# Patient Record
Sex: MALE | Race: WHITE | NOT HISPANIC OR LATINO | Employment: FULL TIME | URBAN - METROPOLITAN AREA
[De-identification: names, ages, dates, MRNs, and addresses within clinical notes are randomized per-mention and may not be internally consistent; named-entity substitution may affect disease eponyms.]

---

## 2018-02-27 ENCOUNTER — OFFICE VISIT (OUTPATIENT)
Dept: INTERNAL MEDICINE | Facility: CLINIC | Age: 33
End: 2018-02-27
Payer: COMMERCIAL

## 2018-02-27 ENCOUNTER — LAB VISIT (OUTPATIENT)
Dept: LAB | Facility: HOSPITAL | Age: 33
End: 2018-02-27
Attending: FAMILY MEDICINE
Payer: COMMERCIAL

## 2018-02-27 DIAGNOSIS — M72.2 PLANTAR FASCIITIS: ICD-10-CM

## 2018-02-27 DIAGNOSIS — Z00.00 ROUTINE MEDICAL EXAM: ICD-10-CM

## 2018-02-27 DIAGNOSIS — E66.3 OVERWEIGHT: ICD-10-CM

## 2018-02-27 DIAGNOSIS — Z00.00 ROUTINE MEDICAL EXAM: Primary | ICD-10-CM

## 2018-02-27 LAB
ALBUMIN SERPL BCP-MCNC: 4.2 G/DL
ALP SERPL-CCNC: 62 U/L
ALT SERPL W/O P-5'-P-CCNC: 13 U/L
ANION GAP SERPL CALC-SCNC: 8 MMOL/L
AST SERPL-CCNC: 14 U/L
BASOPHILS # BLD AUTO: 0.03 K/UL
BASOPHILS NFR BLD: 0.7 %
BILIRUB SERPL-MCNC: 0.9 MG/DL
BUN SERPL-MCNC: 11 MG/DL
CALCIUM SERPL-MCNC: 9.7 MG/DL
CHLORIDE SERPL-SCNC: 104 MMOL/L
CHOLEST SERPL-MCNC: 163 MG/DL
CHOLEST/HDLC SERPL: 3.7 {RATIO}
CO2 SERPL-SCNC: 28 MMOL/L
CREAT SERPL-MCNC: 0.9 MG/DL
DIFFERENTIAL METHOD: NORMAL
EOSINOPHIL # BLD AUTO: 0.1 K/UL
EOSINOPHIL NFR BLD: 1.1 %
ERYTHROCYTE [DISTWIDTH] IN BLOOD BY AUTOMATED COUNT: 12.2 %
EST. GFR  (AFRICAN AMERICAN): >60 ML/MIN/1.73 M^2
EST. GFR  (NON AFRICAN AMERICAN): >60 ML/MIN/1.73 M^2
ESTIMATED AVG GLUCOSE: 97 MG/DL
GLUCOSE SERPL-MCNC: 83 MG/DL
HBA1C MFR BLD HPLC: 5 %
HCT VFR BLD AUTO: 42.6 %
HDLC SERPL-MCNC: 44 MG/DL
HDLC SERPL: 27 %
HGB BLD-MCNC: 14.1 G/DL
IMM GRANULOCYTES # BLD AUTO: 0.02 K/UL
IMM GRANULOCYTES NFR BLD AUTO: 0.4 %
LDLC SERPL CALC-MCNC: 109.4 MG/DL
LYMPHOCYTES # BLD AUTO: 1.8 K/UL
LYMPHOCYTES NFR BLD: 38.7 %
MCH RBC QN AUTO: 30 PG
MCHC RBC AUTO-ENTMCNC: 33.1 G/DL
MCV RBC AUTO: 91 FL
MONOCYTES # BLD AUTO: 0.5 K/UL
MONOCYTES NFR BLD: 10 %
NEUTROPHILS # BLD AUTO: 2.2 K/UL
NEUTROPHILS NFR BLD: 49.1 %
NONHDLC SERPL-MCNC: 119 MG/DL
NRBC BLD-RTO: 0 /100 WBC
PLATELET # BLD AUTO: 219 K/UL
PMV BLD AUTO: 11.4 FL
POTASSIUM SERPL-SCNC: 4.3 MMOL/L
PROT SERPL-MCNC: 7.5 G/DL
RBC # BLD AUTO: 4.7 M/UL
SODIUM SERPL-SCNC: 140 MMOL/L
T4 FREE SERPL-MCNC: 1.16 NG/DL
TRIGL SERPL-MCNC: 48 MG/DL
TSH SERPL DL<=0.005 MIU/L-ACNC: 0.8 UIU/ML
WBC # BLD AUTO: 4.52 K/UL

## 2018-02-27 PROCEDURE — 84439 ASSAY OF FREE THYROXINE: CPT

## 2018-02-27 PROCEDURE — 99395 PREV VISIT EST AGE 18-39: CPT | Mod: S$GLB,,, | Performed by: FAMILY MEDICINE

## 2018-02-27 PROCEDURE — 84443 ASSAY THYROID STIM HORMONE: CPT

## 2018-02-27 PROCEDURE — 80053 COMPREHEN METABOLIC PANEL: CPT

## 2018-02-27 PROCEDURE — 85025 COMPLETE CBC W/AUTO DIFF WBC: CPT

## 2018-02-27 PROCEDURE — 99999 PR PBB SHADOW E&M-EST. PATIENT-LVL III: CPT | Mod: PBBFAC,,, | Performed by: FAMILY MEDICINE

## 2018-02-27 PROCEDURE — 83036 HEMOGLOBIN GLYCOSYLATED A1C: CPT

## 2018-02-27 PROCEDURE — 36415 COLL VENOUS BLD VENIPUNCTURE: CPT | Mod: PO

## 2018-02-27 PROCEDURE — 80061 LIPID PANEL: CPT

## 2018-02-28 VITALS
WEIGHT: 216.25 LBS | HEART RATE: 54 BPM | BODY MASS INDEX: 29.29 KG/M2 | DIASTOLIC BLOOD PRESSURE: 74 MMHG | HEIGHT: 72 IN | TEMPERATURE: 98 F | RESPIRATION RATE: 14 BRPM | SYSTOLIC BLOOD PRESSURE: 124 MMHG

## 2018-02-28 PROBLEM — E66.3 OVERWEIGHT: Status: ACTIVE | Noted: 2018-02-28

## 2018-02-28 PROBLEM — M72.2 PLANTAR FASCIITIS: Status: ACTIVE | Noted: 2018-02-28

## 2018-02-28 NOTE — PROGRESS NOTES
Subjective:   Patient ID: Spenser Alamo is a 32 y.o. male.    Chief Complaint: Establish Care      HPI  31 yo male here to est w pcp.     Patient queried and denies any further complaints.    PREVENTIVE MED  Diet  Exercise  Colorectal Ca  Alcohol use  Tobacco  BP  Depression  Type 2 DM  Hep C  STD  Vision  ALL REVIEWED    ALLERGIES AND MEDICATIONS: updated and reviewed.  Review of patient's allergies indicates:   Allergen Reactions    Amoxicillin Rash     No current outpatient prescriptions on file.    Review of Systems   Constitutional: Negative for activity change and unexpected weight change.   HENT: Negative for hearing loss, rhinorrhea and trouble swallowing.    Eyes: Negative for discharge and visual disturbance.   Respiratory: Negative for chest tightness and wheezing.    Cardiovascular: Negative for chest pain and palpitations.   Gastrointestinal: Negative for blood in stool, constipation, diarrhea and vomiting.   Endocrine: Negative for polydipsia and polyuria.   Genitourinary: Negative for difficulty urinating, hematuria and urgency.   Musculoskeletal: Negative for arthralgias, joint swelling and neck pain.   Skin: Negative for rash and wound.   Allergic/Immunologic: Negative for food allergies and immunocompromised state.   Neurological: Negative for weakness and headaches.   Hematological: Does not bruise/bleed easily.   Psychiatric/Behavioral: Negative for confusion and dysphoric mood.       Objective:     Vitals:    02/27/18 1301   BP: 124/74   Pulse: (!) 54   Resp: 14   Temp: 98 °F (36.7 °C)   TempSrc: Oral   Weight: 98.1 kg (216 lb 4.3 oz)   Height: 6' (1.829 m)   PainSc:   4   PainLoc: Foot     Body mass index is 29.33 kg/m².    Physical Exam   Constitutional: He is oriented to person, place, and time. He appears well-developed and well-nourished. He is cooperative. He does not have a sickly appearance. No distress.   HENT:   Head: Normocephalic and atraumatic.   Right Ear: Hearing, tympanic  membrane, external ear and ear canal normal. No tenderness.   Left Ear: Hearing, tympanic membrane, external ear and ear canal normal. No tenderness.   Nose: Nose normal.   Mouth/Throat: Oropharynx is clear and moist.   Eyes: Conjunctivae and lids are normal. Pupils are equal, round, and reactive to light. Right eye exhibits no discharge. Left eye exhibits no discharge. Right conjunctiva is not injected. Left conjunctiva is not injected. No scleral icterus. Right eye exhibits normal extraocular motion. Left eye exhibits normal extraocular motion.   Neck: Normal range of motion. Neck supple. No JVD present. Carotid bruit is not present. No tracheal deviation and no edema present. No thyromegaly present.   Cardiovascular: Normal rate, regular rhythm, normal heart sounds and normal pulses.  Exam reveals no friction rub.    No murmur heard.  Pulmonary/Chest: Effort normal and breath sounds normal. No accessory muscle usage. No respiratory distress. He has no wheezes. He has no rhonchi. He has no rales.   Abdominal: Soft. Bowel sounds are normal. He exhibits no distension, no abdominal bruit, no pulsatile midline mass and no mass. There is no hepatosplenomegaly. There is no tenderness. There is no rebound, no guarding, no CVA tenderness, no tenderness at McBurney's point and negative Henley's sign.   Musculoskeletal: He exhibits no edema.   Lymphadenopathy:        Head (right side): No submandibular, no preauricular and no posterior auricular adenopathy present.        Head (left side): No submandibular, no preauricular and no posterior auricular adenopathy present.     He has no cervical adenopathy.   Neurological: He is alert and oriented to person, place, and time. GCS eye subscore is 4. GCS verbal subscore is 5. GCS motor subscore is 6.   Skin: Skin is warm and dry. No ecchymosis and no rash noted. Rash is not maculopapular and not urticarial. He is not diaphoretic. No cyanosis or erythema. Nails show no clubbing.    Psychiatric: He has a normal mood and affect. His speech is normal and behavior is normal. Thought content normal. His mood appears not anxious. His affect is not angry and not inappropriate. He does not exhibit a depressed mood.       Assessment and Plan:   Spenser was seen today for establish care.    Diagnoses and all orders for this visit:    Routine medical exam  -     CBC auto differential; Future  -     Comprehensive metabolic panel; Future  -     Hemoglobin A1c; Future  -     Lipid panel; Future  -     TSH; Future  -     T4, free; Future    Plantar fasciitis  -     Cancel: Ambulatory consult to Podiatry    Overweight    declines referral for now. Discussed orthotics. Compression socks specifically for plantar fasc and nsaids.        Follow-up in about 1 year (around 2/27/2019).    THIS NOTE WILL BE SHARED WITH THE PATIENT.

## 2019-01-17 ENCOUNTER — OFFICE VISIT (OUTPATIENT)
Dept: INTERNAL MEDICINE | Facility: CLINIC | Age: 34
End: 2019-01-17
Payer: COMMERCIAL

## 2019-01-17 VITALS
OXYGEN SATURATION: 97 % | HEART RATE: 91 BPM | BODY MASS INDEX: 31.32 KG/M2 | SYSTOLIC BLOOD PRESSURE: 104 MMHG | WEIGHT: 231.25 LBS | TEMPERATURE: 100 F | HEIGHT: 72 IN | DIASTOLIC BLOOD PRESSURE: 77 MMHG

## 2019-01-17 DIAGNOSIS — B96.89 ACUTE BACTERIAL BRONCHITIS: Primary | ICD-10-CM

## 2019-01-17 DIAGNOSIS — E66.9 OBESITY, UNSPECIFIED CLASSIFICATION, UNSPECIFIED OBESITY TYPE, UNSPECIFIED WHETHER SERIOUS COMORBIDITY PRESENT: ICD-10-CM

## 2019-01-17 DIAGNOSIS — J20.8 ACUTE BACTERIAL BRONCHITIS: Primary | ICD-10-CM

## 2019-01-17 DIAGNOSIS — M72.2 PLANTAR FASCIITIS: ICD-10-CM

## 2019-01-17 PROCEDURE — 96372 THER/PROPH/DIAG INJ SC/IM: CPT | Mod: S$GLB,,, | Performed by: FAMILY MEDICINE

## 2019-01-17 PROCEDURE — 99214 PR OFFICE/OUTPT VISIT, EST, LEVL IV, 30-39 MIN: ICD-10-PCS | Mod: 25,S$GLB,, | Performed by: FAMILY MEDICINE

## 2019-01-17 PROCEDURE — 99214 OFFICE O/P EST MOD 30 MIN: CPT | Mod: 25,S$GLB,, | Performed by: FAMILY MEDICINE

## 2019-01-17 PROCEDURE — 99999 PR PBB SHADOW E&M-EST. PATIENT-LVL III: CPT | Mod: PBBFAC,,, | Performed by: FAMILY MEDICINE

## 2019-01-17 PROCEDURE — 96372 PR INJECTION,THERAP/PROPH/DIAG2ST, IM OR SUBCUT: ICD-10-PCS | Mod: S$GLB,,, | Performed by: FAMILY MEDICINE

## 2019-01-17 PROCEDURE — 99999 PR PBB SHADOW E&M-EST. PATIENT-LVL III: ICD-10-PCS | Mod: PBBFAC,,, | Performed by: FAMILY MEDICINE

## 2019-01-17 RX ORDER — PROMETHAZINE HYDROCHLORIDE AND CODEINE PHOSPHATE 6.25; 1 MG/5ML; MG/5ML
5 SOLUTION ORAL NIGHTLY PRN
Qty: 118 ML | Refills: 0 | Status: SHIPPED | OUTPATIENT
Start: 2019-01-17 | End: 2019-01-27

## 2019-01-17 RX ORDER — METHYLPREDNISOLONE 4 MG/1
TABLET ORAL
Qty: 1 PACKAGE | Refills: 0 | Status: SHIPPED | OUTPATIENT
Start: 2019-01-17 | End: 2019-01-17 | Stop reason: SDUPTHER

## 2019-01-17 RX ORDER — AZITHROMYCIN 250 MG/1
TABLET, FILM COATED ORAL
Qty: 6 TABLET | Refills: 0 | Status: SHIPPED | OUTPATIENT
Start: 2019-01-17 | End: 2019-01-17 | Stop reason: SDUPTHER

## 2019-01-17 RX ORDER — PROMETHAZINE HYDROCHLORIDE AND CODEINE PHOSPHATE 6.25; 1 MG/5ML; MG/5ML
5 SOLUTION ORAL NIGHTLY PRN
Qty: 118 ML | Refills: 0 | Status: SHIPPED | OUTPATIENT
Start: 2019-01-17 | End: 2019-01-17 | Stop reason: SDUPTHER

## 2019-01-17 RX ORDER — AZITHROMYCIN 250 MG/1
TABLET, FILM COATED ORAL
Qty: 6 TABLET | Refills: 0 | Status: SHIPPED | OUTPATIENT
Start: 2019-01-17 | End: 2020-02-13

## 2019-01-17 RX ORDER — TRIAMCINOLONE ACETONIDE 40 MG/ML
40 INJECTION, SUSPENSION INTRA-ARTICULAR; INTRAMUSCULAR
Status: COMPLETED | OUTPATIENT
Start: 2019-01-17 | End: 2019-01-17

## 2019-01-17 RX ORDER — METHYLPREDNISOLONE 4 MG/1
TABLET ORAL
Qty: 1 PACKAGE | Refills: 0 | Status: SHIPPED | OUTPATIENT
Start: 2019-01-17 | End: 2020-02-13

## 2019-01-17 RX ADMIN — TRIAMCINOLONE ACETONIDE 40 MG: 40 INJECTION, SUSPENSION INTRA-ARTICULAR; INTRAMUSCULAR at 05:01

## 2019-01-21 PROBLEM — E66.3 OVERWEIGHT: Status: RESOLVED | Noted: 2018-02-28 | Resolved: 2019-01-21

## 2019-01-21 PROBLEM — E66.9 OBESITY: Status: ACTIVE | Noted: 2019-01-21

## 2019-01-21 NOTE — PROGRESS NOTES
Subjective:   Patient ID: Spenser Alamo is a 33 y.o. male.    Chief Complaint: Cough; Fatigue; Fever; and Chills      Cough   This is a new problem. The current episode started 1 to 4 weeks ago. The problem has been gradually worsening. The cough is productive of brown sputum, productive of purulent sputum and productive of sputum. Associated symptoms include chest pain, ear congestion, ear pain, heartburn, nasal congestion and shortness of breath. Pertinent negatives include no chills, fever, headaches, hemoptysis, postnasal drip, rash, rhinorrhea, sore throat or wheezing. The symptoms are aggravated by exercise, fumes and dust. He has tried nothing for the symptoms.       Patient queried and denies any further complaints.      ALLERGIES AND MEDICATIONS: updated and reviewed.  Review of patient's allergies indicates:   Allergen Reactions    Amoxicillin Rash       Current Outpatient Medications:     azithromycin (Z-BAY) 250 MG tablet, Take 2 tablets by mouth on day 1; Take 1 tablet by mouth on days 2-5, Disp: 6 tablet, Rfl: 0    methylPREDNISolone (MEDROL DOSEPACK) 4 mg tablet, use as directed, Disp: 1 Package, Rfl: 0    promethazine-codeine 6.25-10 mg/5 ml (PHENERGAN WITH CODEINE) 6.25-10 mg/5 mL syrup, Take 5 mLs by mouth nightly as needed for Cough., Disp: 118 mL, Rfl: 0    Review of Systems   Constitutional: Negative for activity change, appetite change, chills, diaphoresis, fatigue, fever and unexpected weight change.   HENT: Positive for ear pain. Negative for congestion, ear discharge, postnasal drip, rhinorrhea, sneezing and sore throat.    Eyes: Negative for photophobia and discharge.   Respiratory: Positive for cough and shortness of breath. Negative for hemoptysis, chest tightness and wheezing.    Cardiovascular: Positive for chest pain. Negative for palpitations.   Gastrointestinal: Positive for heartburn. Negative for abdominal distention, abdominal pain, diarrhea, nausea and vomiting.    Genitourinary: Negative for dysuria.   Musculoskeletal: Negative for arthralgias and neck pain.   Skin: Negative for rash.   Neurological: Negative for headaches.       Objective:     Vitals:    01/17/19 1618   BP: 104/77   Pulse: 91   Temp: 100.1 °F (37.8 °C)   TempSrc: Oral   SpO2: 97%   Weight: 104.9 kg (231 lb 4.2 oz)   Height: 6' (1.829 m)   PainSc: 0-No pain     Body mass index is 31.36 kg/m².    Physical Exam   Constitutional: He appears well-developed and well-nourished.   HENT:   Head: Normocephalic and atraumatic.   Right Ear: Hearing, tympanic membrane, external ear and ear canal normal. No drainage or swelling. No decreased hearing is noted.   Left Ear: Hearing, tympanic membrane, external ear and ear canal normal. No drainage or swelling. No decreased hearing is noted.   Nose: Nose normal. No rhinorrhea.   Mouth/Throat: Oropharynx is clear and moist. No oropharyngeal exudate, posterior oropharyngeal edema or posterior oropharyngeal erythema.   Eyes: Conjunctivae, EOM and lids are normal. Pupils are equal, round, and reactive to light. Right eye exhibits no discharge and no exudate. Left eye exhibits no discharge and no exudate. Right conjunctiva is not injected. Left conjunctiva is not injected.   Neck: Trachea normal and full passive range of motion without pain. Normal carotid pulses, no hepatojugular reflux and no JVD present. Carotid bruit is not present. No neck rigidity. No edema and no erythema present. No thyroid mass and no thyromegaly present.   Cardiovascular: Normal rate, regular rhythm and normal heart sounds.   Pulmonary/Chest: Effort normal. No respiratory distress.   Abdominal: Soft. Normal appearance and bowel sounds are normal. There is no tenderness. There is negative Henley's sign.   Lymphadenopathy:     He has no cervical adenopathy.   Neurological: He is alert.   Skin: Skin is warm and dry.   Psychiatric: He has a normal mood and affect. His speech is normal and behavior is  normal.   Nursing note and vitals reviewed.      Assessment and Plan:   Spenser was seen today for cough, fatigue, fever and chills.    Diagnoses and all orders for this visit:    Acute bacterial bronchitis    Obesity, unspecified classification, unspecified obesity type, unspecified whether serious comorbidity present    Plantar fasciitis    Other orders  -     triamcinolone acetonide injection 40 mg  -     Discontinue: azithromycin (Z-BAY) 250 MG tablet; Take 2 tablets by mouth on day 1; Take 1 tablet by mouth on days 2-5  -     Discontinue: methylPREDNISolone (MEDROL DOSEPACK) 4 mg tablet; use as directed  -     Discontinue: promethazine-codeine 6.25-10 mg/5 ml (PHENERGAN WITH CODEINE) 6.25-10 mg/5 mL syrup; Take 5 mLs by mouth nightly as needed for Cough.  -     azithromycin (Z-BAY) 250 MG tablet; Take 2 tablets by mouth on day 1; Take 1 tablet by mouth on days 2-5  -     methylPREDNISolone (MEDROL DOSEPACK) 4 mg tablet; use as directed  -     promethazine-codeine 6.25-10 mg/5 ml (PHENERGAN WITH CODEINE) 6.25-10 mg/5 mL syrup; Take 5 mLs by mouth nightly as needed for Cough.        Follow-up in about 2 weeks (around 1/31/2019), or if symptoms worsen or fail to improve.    THIS NOTE WILL BE SHARED WITH THE PATIENT.

## 2020-02-13 ENCOUNTER — OFFICE VISIT (OUTPATIENT)
Dept: PRIMARY CARE CLINIC | Facility: CLINIC | Age: 35
End: 2020-02-13
Payer: COMMERCIAL

## 2020-02-13 VITALS
DIASTOLIC BLOOD PRESSURE: 80 MMHG | WEIGHT: 232.56 LBS | TEMPERATURE: 98 F | SYSTOLIC BLOOD PRESSURE: 114 MMHG | BODY MASS INDEX: 30.82 KG/M2 | HEART RATE: 60 BPM | HEIGHT: 73 IN

## 2020-02-13 DIAGNOSIS — E66.9 OBESITY, UNSPECIFIED CLASSIFICATION, UNSPECIFIED OBESITY TYPE, UNSPECIFIED WHETHER SERIOUS COMORBIDITY PRESENT: ICD-10-CM

## 2020-02-13 DIAGNOSIS — F32.1 CURRENT MODERATE EPISODE OF MAJOR DEPRESSIVE DISORDER WITHOUT PRIOR EPISODE: Primary | ICD-10-CM

## 2020-02-13 DIAGNOSIS — Z23 NEED FOR INFLUENZA VACCINATION: ICD-10-CM

## 2020-02-13 DIAGNOSIS — F41.1 GAD (GENERALIZED ANXIETY DISORDER): ICD-10-CM

## 2020-02-13 PROCEDURE — 99214 OFFICE O/P EST MOD 30 MIN: CPT | Mod: 25,S$GLB,, | Performed by: FAMILY MEDICINE

## 2020-02-13 PROCEDURE — 99214 PR OFFICE/OUTPT VISIT, EST, LEVL IV, 30-39 MIN: ICD-10-PCS | Mod: 25,S$GLB,, | Performed by: FAMILY MEDICINE

## 2020-02-13 PROCEDURE — 90471 FLU VACCINE (QUAD) GREATER THAN OR EQUAL TO 3YO PRESERVATIVE FREE IM: ICD-10-PCS | Mod: S$GLB,,, | Performed by: FAMILY MEDICINE

## 2020-02-13 PROCEDURE — 90686 IIV4 VACC NO PRSV 0.5 ML IM: CPT | Mod: S$GLB,,, | Performed by: FAMILY MEDICINE

## 2020-02-13 PROCEDURE — 90686 FLU VACCINE (QUAD) GREATER THAN OR EQUAL TO 3YO PRESERVATIVE FREE IM: ICD-10-PCS | Mod: S$GLB,,, | Performed by: FAMILY MEDICINE

## 2020-02-13 PROCEDURE — 90471 IMMUNIZATION ADMIN: CPT | Mod: S$GLB,,, | Performed by: FAMILY MEDICINE

## 2020-02-13 PROCEDURE — 99999 PR PBB SHADOW E&M-EST. PATIENT-LVL III: ICD-10-PCS | Mod: PBBFAC,,, | Performed by: FAMILY MEDICINE

## 2020-02-13 PROCEDURE — 99999 PR PBB SHADOW E&M-EST. PATIENT-LVL III: CPT | Mod: PBBFAC,,, | Performed by: FAMILY MEDICINE

## 2020-02-13 RX ORDER — FLUOXETINE HYDROCHLORIDE 20 MG/1
CAPSULE ORAL
Qty: 30 CAPSULE | Refills: 1 | Status: SHIPPED | OUTPATIENT
Start: 2020-02-13 | End: 2020-02-21 | Stop reason: SDUPTHER

## 2020-02-13 RX ORDER — FLUOXETINE 10 MG/1
CAPSULE ORAL
Qty: 7 CAPSULE | Refills: 0 | Status: SHIPPED | OUTPATIENT
Start: 2020-02-13 | End: 2020-02-21

## 2020-02-13 RX ORDER — HYDROXYZINE HYDROCHLORIDE 25 MG/1
TABLET, FILM COATED ORAL
Qty: 60 TABLET | Refills: 1 | Status: SHIPPED | OUTPATIENT
Start: 2020-02-13 | End: 2020-02-21 | Stop reason: SDUPTHER

## 2020-02-13 NOTE — PROGRESS NOTES
Subjective:   Patient ID: Spenser Alamo is a 34 y.o. male.    Chief Complaint: Anxiety and Immunizations (FLU)      Anxiety   Presents for initial visit. Onset was at an unknown time. The problem has been gradually worsening. Symptoms include compulsions, confusion, decreased concentration, depressed mood, dizziness, dry mouth, excessive worry, feeling of choking, insomnia, irritability, malaise, muscle tension, nervous/anxious behavior, obsessions, palpitations, panic and restlessness. Patient reports no chest pain, nausea, shortness of breath or suicidal ideas. Symptoms occur constantly. The severity of symptoms is interfering with daily activities. The symptoms are aggravated by work stress. The quality of sleep is fair.     Risk factors include a major life event. There is no history of anemia. Past treatments include nothing.   Depression   Visit Type: initial  Onset of symptoms: at an unknown time  Progression since onset: rapidly worsening  Patient presents with the following symptoms: anhedonia, compulsions, confusion, decreased concentration, depressed mood, dry mouth, excessive worry, fatigue, insomnia, irritability, malaise, muscle tension, nervousness/anxiety, obsessions, palpitations, panic and restlessness.  Patient is not experiencing: chest pain, choking sensation, shortness of breath, suicidal ideas and suicidal planning.  Frequency of symptoms: most days   Severity: interfering with daily activities   Sleep quality: fair          Patient queried and denies any further complaints.    LOCATION  DURATION  SEVERITY  QUALITY  TIMING  CAUSE  ASSOCIATED SYMPTOMS  MODIFIERS.    ALLERGIES AND MEDICATIONS: updated and reviewed.  Review of patient's allergies indicates:   Allergen Reactions    Amoxicillin Rash       Current Outpatient Medications:     FLUoxetine 10 MG capsule, One po daily x 7 days then start 20mg dose, Disp: 7 capsule, Rfl: 0    FLUoxetine 20 MG capsule, One po daily after  "completing the 10mg dose, Disp: 30 capsule, Rfl: 1    hydrOXYzine HCl (ATARAX) 25 MG tablet, 1/2 to 1 po q 8 hr prn acute anxiety, Disp: 60 tablet, Rfl: 1    Review of Systems   Constitutional: Positive for irritability. Negative for activity change, appetite change, chills, diaphoresis, fatigue, fever and unexpected weight change.   HENT: Negative for congestion, ear discharge, ear pain, postnasal drip, rhinorrhea, sneezing and sore throat.    Eyes: Negative for photophobia and discharge.   Respiratory: Negative for cough, choking, chest tightness, shortness of breath and wheezing.    Cardiovascular: Positive for palpitations. Negative for chest pain.   Gastrointestinal: Negative for abdominal distention, abdominal pain, diarrhea, nausea and vomiting.   Genitourinary: Negative for dysuria.   Musculoskeletal: Negative for arthralgias and neck pain.   Skin: Negative for rash.   Neurological: Positive for dizziness. Negative for headaches.   Psychiatric/Behavioral: Positive for confusion, decreased concentration and depression. Negative for suicidal ideas. The patient is nervous/anxious and has insomnia.        Objective:     Vitals:    02/13/20 0826   BP: 114/80   Pulse: 60   Temp: 97.7 °F (36.5 °C)   Weight: 105.5 kg (232 lb 9.4 oz)   Height: 6' 1" (1.854 m)     Body mass index is 30.69 kg/m².    Physical Exam   Constitutional: He is oriented to person, place, and time. He appears well-developed and well-nourished.   HENT:   Head: Normocephalic and atraumatic.   Neurological: He is alert and oriented to person, place, and time.   Skin: Skin is warm and dry.   Psychiatric: He has a normal mood and affect. His behavior is normal.   Nursing note and vitals reviewed.      Assessment and Plan:   Spenser was seen today for anxiety and immunizations.    Diagnoses and all orders for this visit:    Current moderate episode of major depressive disorder without prior episode  -     Ambulatory referral/consult to Psychiatry; " Future    Need for influenza vaccination  -     Influenza - Quadrivalent (6 months+) (PF)    KALEB (generalized anxiety disorder)    Obesity, unspecified classification, unspecified obesity type, unspecified whether serious comorbidity present    Other orders  -     FLUoxetine 10 MG capsule; One po daily x 7 days then start 20mg dose  -     hydrOXYzine HCl (ATARAX) 25 MG tablet; 1/2 to 1 po q 8 hr prn acute anxiety  -     FLUoxetine 20 MG capsule; One po daily after completing the 10mg dose     Consider outside Psychiatry.  [facility].  Consider [facility].    Exercise and diet for weight loss.  Exercise also along with medications will likely help mood.  Follow-up in about a month    Follow up in about 4 weeks (around 3/12/2020).    THIS NOTE WILL BE SHARED WITH THE PATIENT.

## 2020-02-21 ENCOUNTER — PATIENT MESSAGE (OUTPATIENT)
Dept: PRIMARY CARE CLINIC | Facility: CLINIC | Age: 35
End: 2020-02-21

## 2020-02-21 RX ORDER — FLUOXETINE HYDROCHLORIDE 20 MG/1
CAPSULE ORAL
Qty: 90 CAPSULE | Refills: 0 | Status: SHIPPED | OUTPATIENT
Start: 2020-02-21 | End: 2021-05-19 | Stop reason: SDUPTHER

## 2020-02-21 RX ORDER — HYDROXYZINE HYDROCHLORIDE 25 MG/1
TABLET, FILM COATED ORAL
Qty: 60 TABLET | Refills: 1 | Status: SHIPPED | OUTPATIENT
Start: 2020-02-21 | End: 2023-01-31

## 2020-10-01 ENCOUNTER — IMMUNIZATION (OUTPATIENT)
Dept: PHARMACY | Facility: CLINIC | Age: 35
End: 2020-10-01
Payer: COMMERCIAL

## 2020-10-21 ENCOUNTER — PATIENT OUTREACH (OUTPATIENT)
Dept: ADMINISTRATIVE | Facility: OTHER | Age: 35
End: 2020-10-21

## 2020-10-21 NOTE — PROGRESS NOTES
Requested updates within Care Everywhere.  Patient's chart was reviewed for overdue KAVITHA topics.  Immunizations reconciled.    Orders placed:n/a  Tasked appts:n/a  Labs Linked:n/a

## 2020-10-22 ENCOUNTER — OFFICE VISIT (OUTPATIENT)
Dept: OPTOMETRY | Facility: CLINIC | Age: 35
End: 2020-10-22
Payer: COMMERCIAL

## 2020-10-22 DIAGNOSIS — H52.13 MYOPIA OF BOTH EYES WITH REGULAR ASTIGMATISM: Primary | ICD-10-CM

## 2020-10-22 DIAGNOSIS — H52.223 MYOPIA OF BOTH EYES WITH REGULAR ASTIGMATISM: Primary | ICD-10-CM

## 2020-10-22 PROCEDURE — 92015 PR REFRACTION: ICD-10-PCS | Mod: ,,, | Performed by: OPTOMETRIST

## 2020-10-22 PROCEDURE — 92004 COMPRE OPH EXAM NEW PT 1/>: CPT | Mod: S$PBB,,, | Performed by: OPTOMETRIST

## 2020-10-22 PROCEDURE — 99999 PR PBB SHADOW E&M-EST. PATIENT-LVL II: ICD-10-PCS | Mod: PBBFAC,,, | Performed by: OPTOMETRIST

## 2020-10-22 PROCEDURE — 92015 DETERMINE REFRACTIVE STATE: CPT | Mod: ,,, | Performed by: OPTOMETRIST

## 2020-10-22 PROCEDURE — 99999 PR PBB SHADOW E&M-EST. PATIENT-LVL II: CPT | Mod: PBBFAC,,, | Performed by: OPTOMETRIST

## 2020-10-22 PROCEDURE — 92004 PR EYE EXAM, NEW PATIENT,COMPREHESV: ICD-10-PCS | Mod: S$PBB,,, | Performed by: OPTOMETRIST

## 2020-10-22 PROCEDURE — 99212 OFFICE O/P EST SF 10 MIN: CPT | Mod: PBBFAC | Performed by: OPTOMETRIST

## 2020-10-22 NOTE — PROGRESS NOTES
HPI     Mr. Spenser Alamo was referred by self for an eye exam.    Patient complains of slight problems with distance vision. Notes no   problems with near vision. However, does use a computer about 2 hours a   day. He is on his phone most of the day.    Would patient like a refraction today? yes     Paitent denies diplopia, headaches, flashes/floaters, itching, tearing,   burning, redness, and pain.    (-)drops  (-)diabetes    OCULAR HISTORY  Last Eye Exam: 2015 with Dr stubbs  (-)eye surgery   (-)diagnosed or treated for any eye conditions or diseases, n/a    FAMILY HISTORY  (-)Glaucoma        Last edited by Janie Green, OD on 10/22/2020  3:35 PM. (History)            Assessment /Plan     For exam results, see Encounter Report.    Myopia of both eyes with regular astigmatism      Updated SRx. May wear for FTW or only for detailed distance tasks. Monitor yearly.       RTC in 1 year for annual eye exam or sooner if needed.

## 2021-04-05 ENCOUNTER — PATIENT MESSAGE (OUTPATIENT)
Dept: ADMINISTRATIVE | Facility: HOSPITAL | Age: 36
End: 2021-04-05

## 2021-04-16 ENCOUNTER — PATIENT MESSAGE (OUTPATIENT)
Dept: RESEARCH | Facility: HOSPITAL | Age: 36
End: 2021-04-16

## 2021-04-27 ENCOUNTER — HOSPITAL ENCOUNTER (OUTPATIENT)
Dept: RADIOLOGY | Facility: HOSPITAL | Age: 36
Discharge: HOME | End: 2021-04-27
Attending: FAMILY MEDICINE
Payer: COMMERCIAL

## 2021-04-27 ENCOUNTER — OFFICE VISIT (OUTPATIENT)
Dept: PRIMARY CARE CLINIC | Facility: CLINIC | Age: 36
End: 2021-04-27
Payer: COMMERCIAL

## 2021-04-27 VITALS
HEIGHT: 72 IN | WEIGHT: 233.44 LBS | HEART RATE: 91 BPM | BODY MASS INDEX: 31.62 KG/M2 | TEMPERATURE: 99 F | DIASTOLIC BLOOD PRESSURE: 84 MMHG | OXYGEN SATURATION: 98 % | SYSTOLIC BLOOD PRESSURE: 110 MMHG

## 2021-04-27 DIAGNOSIS — M25.512 ACUTE PAIN OF LEFT SHOULDER: ICD-10-CM

## 2021-04-27 DIAGNOSIS — M79.641 RIGHT HAND PAIN: Primary | ICD-10-CM

## 2021-04-27 DIAGNOSIS — M79.641 RIGHT HAND PAIN: ICD-10-CM

## 2021-04-27 PROCEDURE — 99214 PR OFFICE/OUTPT VISIT, EST, LEVL IV, 30-39 MIN: ICD-10-PCS | Mod: S$GLB,,, | Performed by: FAMILY MEDICINE

## 2021-04-27 PROCEDURE — 3008F BODY MASS INDEX DOCD: CPT | Mod: CPTII,S$GLB,, | Performed by: FAMILY MEDICINE

## 2021-04-27 PROCEDURE — 99999 PR PBB SHADOW E&M-EST. PATIENT-LVL IV: ICD-10-PCS | Mod: PBBFAC,,, | Performed by: FAMILY MEDICINE

## 2021-04-27 PROCEDURE — 73130 X-RAY EXAM OF HAND: CPT | Mod: 26,RT,, | Performed by: RADIOLOGY

## 2021-04-27 PROCEDURE — 99214 OFFICE O/P EST MOD 30 MIN: CPT | Mod: S$GLB,,, | Performed by: FAMILY MEDICINE

## 2021-04-27 PROCEDURE — 73030 X-RAY EXAM OF SHOULDER: CPT | Mod: TC,PN,LT

## 2021-04-27 PROCEDURE — 73130 X-RAY EXAM OF HAND: CPT | Mod: TC,PN,RT

## 2021-04-27 PROCEDURE — 3008F PR BODY MASS INDEX (BMI) DOCUMENTED: ICD-10-PCS | Mod: CPTII,S$GLB,, | Performed by: FAMILY MEDICINE

## 2021-04-27 PROCEDURE — 1125F PR PAIN SEVERITY QUANTIFIED, PAIN PRESENT: ICD-10-PCS | Mod: S$GLB,,, | Performed by: FAMILY MEDICINE

## 2021-04-27 PROCEDURE — 73030 X-RAY EXAM OF SHOULDER: CPT | Mod: 26,LT,, | Performed by: RADIOLOGY

## 2021-04-27 PROCEDURE — 99999 PR PBB SHADOW E&M-EST. PATIENT-LVL IV: CPT | Mod: PBBFAC,,, | Performed by: FAMILY MEDICINE

## 2021-04-27 PROCEDURE — 1125F AMNT PAIN NOTED PAIN PRSNT: CPT | Mod: S$GLB,,, | Performed by: FAMILY MEDICINE

## 2021-04-27 PROCEDURE — 73030 XR SHOULDER COMPLETE 2 OR MORE VIEWS LEFT: ICD-10-PCS | Mod: 26,LT,, | Performed by: RADIOLOGY

## 2021-04-27 PROCEDURE — 73130 XR HAND COMPLETE 3 VIEW RIGHT: ICD-10-PCS | Mod: 26,RT,, | Performed by: RADIOLOGY

## 2021-04-27 RX ORDER — DICLOFENAC SODIUM 75 MG/1
75 TABLET, DELAYED RELEASE ORAL 2 TIMES DAILY
Qty: 30 TABLET | Refills: 0 | Status: SHIPPED | OUTPATIENT
Start: 2021-04-27 | End: 2022-06-08

## 2021-05-18 ENCOUNTER — PATIENT MESSAGE (OUTPATIENT)
Dept: PRIMARY CARE CLINIC | Facility: CLINIC | Age: 36
End: 2021-05-18

## 2021-05-19 RX ORDER — FLUOXETINE HYDROCHLORIDE 20 MG/1
CAPSULE ORAL
Qty: 90 CAPSULE | Refills: 3 | Status: SHIPPED | OUTPATIENT
Start: 2021-05-19 | End: 2022-06-08 | Stop reason: SDUPTHER

## 2022-06-08 ENCOUNTER — LAB VISIT (OUTPATIENT)
Dept: LAB | Facility: HOSPITAL | Age: 37
End: 2022-06-08
Attending: FAMILY MEDICINE
Payer: COMMERCIAL

## 2022-06-08 ENCOUNTER — OFFICE VISIT (OUTPATIENT)
Dept: PRIMARY CARE CLINIC | Facility: CLINIC | Age: 37
End: 2022-06-08
Payer: COMMERCIAL

## 2022-06-08 VITALS
DIASTOLIC BLOOD PRESSURE: 80 MMHG | BODY MASS INDEX: 33.33 KG/M2 | SYSTOLIC BLOOD PRESSURE: 110 MMHG | HEIGHT: 72 IN | OXYGEN SATURATION: 99 % | WEIGHT: 246.06 LBS | HEART RATE: 73 BPM | TEMPERATURE: 99 F

## 2022-06-08 DIAGNOSIS — G89.29 CHRONIC RIGHT SHOULDER PAIN: ICD-10-CM

## 2022-06-08 DIAGNOSIS — B35.4 DERMATOPHYTOSIS OF THE TRUNK: ICD-10-CM

## 2022-06-08 DIAGNOSIS — F41.1 GAD (GENERALIZED ANXIETY DISORDER): ICD-10-CM

## 2022-06-08 DIAGNOSIS — M25.511 CHRONIC RIGHT SHOULDER PAIN: ICD-10-CM

## 2022-06-08 DIAGNOSIS — Z00.00 ROUTINE MEDICAL EXAM: Primary | ICD-10-CM

## 2022-06-08 DIAGNOSIS — Z00.00 ROUTINE MEDICAL EXAM: ICD-10-CM

## 2022-06-08 DIAGNOSIS — E66.9 OBESITY, UNSPECIFIED CLASSIFICATION, UNSPECIFIED OBESITY TYPE, UNSPECIFIED WHETHER SERIOUS COMORBIDITY PRESENT: ICD-10-CM

## 2022-06-08 LAB
ALBUMIN SERPL BCP-MCNC: 4.3 G/DL (ref 3.5–5.2)
ALP SERPL-CCNC: 70 U/L (ref 55–135)
ALT SERPL W/O P-5'-P-CCNC: 19 U/L (ref 10–44)
ANION GAP SERPL CALC-SCNC: 12 MMOL/L (ref 8–16)
AST SERPL-CCNC: 20 U/L (ref 10–40)
BILIRUB SERPL-MCNC: 0.9 MG/DL (ref 0.1–1)
BUN SERPL-MCNC: 12 MG/DL (ref 6–20)
CALCIUM SERPL-MCNC: 9.7 MG/DL (ref 8.7–10.5)
CHLORIDE SERPL-SCNC: 104 MMOL/L (ref 95–110)
CHOLEST SERPL-MCNC: 250 MG/DL (ref 120–199)
CHOLEST/HDLC SERPL: 4.8 {RATIO} (ref 2–5)
CO2 SERPL-SCNC: 24 MMOL/L (ref 23–29)
CREAT SERPL-MCNC: 1 MG/DL (ref 0.5–1.4)
ERYTHROCYTE [DISTWIDTH] IN BLOOD BY AUTOMATED COUNT: 12 % (ref 11.5–14.5)
EST. GFR  (AFRICAN AMERICAN): >60 ML/MIN/1.73 M^2
EST. GFR  (NON AFRICAN AMERICAN): >60 ML/MIN/1.73 M^2
ESTIMATED AVG GLUCOSE: 108 MG/DL (ref 68–131)
GLUCOSE SERPL-MCNC: 96 MG/DL (ref 70–110)
HBA1C MFR BLD: 5.4 % (ref 4–5.6)
HCT VFR BLD AUTO: 45 % (ref 40–54)
HDLC SERPL-MCNC: 52 MG/DL (ref 40–75)
HDLC SERPL: 20.8 % (ref 20–50)
HGB BLD-MCNC: 14.7 G/DL (ref 14–18)
LDLC SERPL CALC-MCNC: 177.6 MG/DL (ref 63–159)
MCH RBC QN AUTO: 31.6 PG (ref 27–31)
MCHC RBC AUTO-ENTMCNC: 32.7 G/DL (ref 32–36)
MCV RBC AUTO: 97 FL (ref 82–98)
NONHDLC SERPL-MCNC: 198 MG/DL
PLATELET # BLD AUTO: 258 K/UL (ref 150–450)
PMV BLD AUTO: 10.8 FL (ref 9.2–12.9)
POTASSIUM SERPL-SCNC: 4.6 MMOL/L (ref 3.5–5.1)
PROT SERPL-MCNC: 7.4 G/DL (ref 6–8.4)
RBC # BLD AUTO: 4.65 M/UL (ref 4.6–6.2)
SODIUM SERPL-SCNC: 140 MMOL/L (ref 136–145)
T4 FREE SERPL-MCNC: 0.86 NG/DL (ref 0.71–1.51)
TRIGL SERPL-MCNC: 102 MG/DL (ref 30–150)
TSH SERPL DL<=0.005 MIU/L-ACNC: 0.99 UIU/ML (ref 0.4–4)
WBC # BLD AUTO: 4.54 K/UL (ref 3.9–12.7)

## 2022-06-08 PROCEDURE — 3079F DIAST BP 80-89 MM HG: CPT | Mod: CPTII,S$GLB,, | Performed by: FAMILY MEDICINE

## 2022-06-08 PROCEDURE — 3074F PR MOST RECENT SYSTOLIC BLOOD PRESSURE < 130 MM HG: ICD-10-PCS | Mod: CPTII,S$GLB,, | Performed by: FAMILY MEDICINE

## 2022-06-08 PROCEDURE — 99999 PR PBB SHADOW E&M-EST. PATIENT-LVL IV: ICD-10-PCS | Mod: PBBFAC,,, | Performed by: FAMILY MEDICINE

## 2022-06-08 PROCEDURE — 99395 PREV VISIT EST AGE 18-39: CPT | Mod: S$GLB,,, | Performed by: FAMILY MEDICINE

## 2022-06-08 PROCEDURE — 1159F MED LIST DOCD IN RCRD: CPT | Mod: CPTII,S$GLB,, | Performed by: FAMILY MEDICINE

## 2022-06-08 PROCEDURE — 84443 ASSAY THYROID STIM HORMONE: CPT | Performed by: FAMILY MEDICINE

## 2022-06-08 PROCEDURE — 3079F PR MOST RECENT DIASTOLIC BLOOD PRESSURE 80-89 MM HG: ICD-10-PCS | Mod: CPTII,S$GLB,, | Performed by: FAMILY MEDICINE

## 2022-06-08 PROCEDURE — 1159F PR MEDICATION LIST DOCUMENTED IN MEDICAL RECORD: ICD-10-PCS | Mod: CPTII,S$GLB,, | Performed by: FAMILY MEDICINE

## 2022-06-08 PROCEDURE — 3074F SYST BP LT 130 MM HG: CPT | Mod: CPTII,S$GLB,, | Performed by: FAMILY MEDICINE

## 2022-06-08 PROCEDURE — 1160F PR REVIEW ALL MEDS BY PRESCRIBER/CLIN PHARMACIST DOCUMENTED: ICD-10-PCS | Mod: CPTII,S$GLB,, | Performed by: FAMILY MEDICINE

## 2022-06-08 PROCEDURE — 83036 HEMOGLOBIN GLYCOSYLATED A1C: CPT | Performed by: FAMILY MEDICINE

## 2022-06-08 PROCEDURE — 1160F RVW MEDS BY RX/DR IN RCRD: CPT | Mod: CPTII,S$GLB,, | Performed by: FAMILY MEDICINE

## 2022-06-08 PROCEDURE — 80053 COMPREHEN METABOLIC PANEL: CPT | Performed by: FAMILY MEDICINE

## 2022-06-08 PROCEDURE — 3044F PR MOST RECENT HEMOGLOBIN A1C LEVEL <7.0%: ICD-10-PCS | Mod: CPTII,S$GLB,, | Performed by: FAMILY MEDICINE

## 2022-06-08 PROCEDURE — 3044F HG A1C LEVEL LT 7.0%: CPT | Mod: CPTII,S$GLB,, | Performed by: FAMILY MEDICINE

## 2022-06-08 PROCEDURE — 3008F PR BODY MASS INDEX (BMI) DOCUMENTED: ICD-10-PCS | Mod: CPTII,S$GLB,, | Performed by: FAMILY MEDICINE

## 2022-06-08 PROCEDURE — 99999 PR PBB SHADOW E&M-EST. PATIENT-LVL IV: CPT | Mod: PBBFAC,,, | Performed by: FAMILY MEDICINE

## 2022-06-08 PROCEDURE — 80061 LIPID PANEL: CPT | Performed by: FAMILY MEDICINE

## 2022-06-08 PROCEDURE — 99395 PR PREVENTIVE VISIT,EST,18-39: ICD-10-PCS | Mod: S$GLB,,, | Performed by: FAMILY MEDICINE

## 2022-06-08 PROCEDURE — 85027 COMPLETE CBC AUTOMATED: CPT | Performed by: FAMILY MEDICINE

## 2022-06-08 PROCEDURE — 36415 COLL VENOUS BLD VENIPUNCTURE: CPT | Mod: PN | Performed by: FAMILY MEDICINE

## 2022-06-08 PROCEDURE — 3008F BODY MASS INDEX DOCD: CPT | Mod: CPTII,S$GLB,, | Performed by: FAMILY MEDICINE

## 2022-06-08 PROCEDURE — 84439 ASSAY OF FREE THYROXINE: CPT | Performed by: FAMILY MEDICINE

## 2022-06-08 RX ORDER — KETOCONAZOLE 200 MG/1
TABLET ORAL
Qty: 2 TABLET | Refills: 0 | Status: SHIPPED | OUTPATIENT
Start: 2022-06-08 | End: 2023-01-31

## 2022-06-08 RX ORDER — KETOCONAZOLE 20 MG/ML
SHAMPOO, SUSPENSION TOPICAL
Qty: 120 ML | Refills: 11 | Status: SHIPPED | OUTPATIENT
Start: 2022-06-08 | End: 2023-01-31

## 2022-06-08 RX ORDER — MELOXICAM 15 MG/1
15 TABLET ORAL DAILY
Qty: 30 TABLET | Refills: 1 | Status: SHIPPED | OUTPATIENT
Start: 2022-06-08 | End: 2023-01-31

## 2022-06-08 RX ORDER — KETOCONAZOLE 20 MG/G
CREAM TOPICAL DAILY
Qty: 60 G | Refills: 5 | Status: SHIPPED | OUTPATIENT
Start: 2022-06-08 | End: 2023-01-31

## 2022-06-08 RX ORDER — FLUOXETINE HYDROCHLORIDE 20 MG/1
CAPSULE ORAL
Qty: 90 CAPSULE | Refills: 3 | Status: SHIPPED | OUTPATIENT
Start: 2022-06-08 | End: 2023-01-31 | Stop reason: SDUPTHER

## 2022-06-08 NOTE — PROGRESS NOTES
Subjective:   Patient ID: Spenser Alamo is a 36 y.o. male.    Chief Complaint: Annual Exam      HPI  36-year-old male here for annual exam    Has some chronic right shoulder pain.  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  Sometimes 8/10 pain at its worst    Patient queried and denies any further complaints.    ALLERGIES AND MEDICATIONS: updated and reviewed.  Review of patient's allergies indicates:   Allergen Reactions    Amoxicillin Rash       Current Outpatient Medications:     hydrOXYzine HCl (ATARAX) 25 MG tablet, 1/2 to 1 po q 8 hr prn acute anxiety, Disp: 60 tablet, Rfl: 1    Lactobacillus rhamnosus GG (CULTURELLE) 10 billion cell capsule, Take 1 capsule by mouth once daily., Disp: , Rfl:     FLUoxetine 20 MG capsule, One po daily, Disp: 90 capsule, Rfl: 3    ketoconazole (NIZORAL) 2 % cream, Apply topically once daily. X 2wks after shower/bath then prn, Disp: 60 g, Rfl: 5    ketoconazole (NIZORAL) 2 % shampoo, Shampoo, leave on 5 min then wash off and may use reg soap or shampoo. Use daily x 2 wks then 2x wkly as needed, Disp: 120 mL, Rfl: 11    ketoconazole (NIZORAL) 200 mg Tab, One p.o. daily With food for 2 days; do not drink alcohol or take Tylenol for 7 days, Disp: 2 tablet, Rfl: 0    meloxicam (MOBIC) 15 MG tablet, Take 1 tablet (15 mg total) by mouth once daily. w food and water for musculoskeletal pain. Do not take more than 30 consecutive days., Disp: 30 tablet, Rfl: 1    Review of Systems   Constitutional: Negative for activity change, appetite change, chills, diaphoresis, fatigue, fever and unexpected weight change.   HENT: Negative for congestion, ear discharge, ear pain, facial swelling, hearing loss, nosebleeds, postnasal drip, rhinorrhea, sinus pressure, sneezing, sore throat, tinnitus, trouble swallowing and voice change.    Eyes: Negative for photophobia, pain, discharge, redness, itching and visual disturbance.   Respiratory: Negative for  cough, chest tightness, shortness of breath and wheezing.    Cardiovascular: Negative for chest pain, palpitations and leg swelling.   Gastrointestinal: Negative for abdominal distention, abdominal pain, anal bleeding, blood in stool, constipation, diarrhea, nausea, rectal pain and vomiting.   Endocrine: Negative for cold intolerance, heat intolerance, polydipsia, polyphagia and polyuria.   Genitourinary: Negative for difficulty urinating, dysuria and flank pain.   Musculoskeletal: Positive for arthralgias (Regarding shoulder). Negative for back pain, joint swelling, myalgias and neck pain.   Skin: Negative for rash.   Neurological: Negative for dizziness, tremors, seizures, syncope, speech difficulty, weakness, light-headedness, numbness and headaches.   Psychiatric/Behavioral: Negative for behavioral problems, confusion, decreased concentration, dysphoric mood, sleep disturbance and suicidal ideas. The patient is not nervous/anxious and is not hyperactive.        Lab Results   Component Value Date    WBC 4.54 06/08/2022    HGB 14.7 06/08/2022    HCT 45.0 06/08/2022    MCV 97 06/08/2022     06/08/2022         CMP  Sodium   Date Value Ref Range Status   06/08/2022 140 136 - 145 mmol/L Final     Potassium   Date Value Ref Range Status   06/08/2022 4.6 3.5 - 5.1 mmol/L Final     Chloride   Date Value Ref Range Status   06/08/2022 104 95 - 110 mmol/L Final     CO2   Date Value Ref Range Status   06/08/2022 24 23 - 29 mmol/L Final     Glucose   Date Value Ref Range Status   06/08/2022 96 70 - 110 mg/dL Final     BUN   Date Value Ref Range Status   06/08/2022 12 6 - 20 mg/dL Final     Creatinine   Date Value Ref Range Status   06/08/2022 1.0 0.5 - 1.4 mg/dL Final     Calcium   Date Value Ref Range Status   06/08/2022 9.7 8.7 - 10.5 mg/dL Final     Total Protein   Date Value Ref Range Status   06/08/2022 7.4 6.0 - 8.4 g/dL Final     Albumin   Date Value Ref Range Status   06/08/2022 4.3 3.5 - 5.2 g/dL Final      Total Bilirubin   Date Value Ref Range Status   06/08/2022 0.9 0.1 - 1.0 mg/dL Final     Comment:     For infants and newborns, interpretation of results should be based  on gestational age, weight and in agreement with clinical  observations.    Premature Infant recommended reference ranges:  Up to 24 hours.............<8.0 mg/dL  Up to 48 hours............<12.0 mg/dL  3-5 days..................<15.0 mg/dL  6-29 days.................<15.0 mg/dL       Alkaline Phosphatase   Date Value Ref Range Status   06/08/2022 70 55 - 135 U/L Final     AST   Date Value Ref Range Status   06/08/2022 20 10 - 40 U/L Final     ALT   Date Value Ref Range Status   06/08/2022 19 10 - 44 U/L Final     Anion Gap   Date Value Ref Range Status   06/08/2022 12 8 - 16 mmol/L Final     eGFR if    Date Value Ref Range Status   06/08/2022 >60.0 >60 mL/min/1.73 m^2 Final     eGFR if non    Date Value Ref Range Status   06/08/2022 >60.0 >60 mL/min/1.73 m^2 Final     Comment:     Calculation used to obtain the estimated glomerular filtration  rate (eGFR) is the CKD-EPI equation.           Lab Results   Component Value Date    HGBA1C 5.4 06/08/2022        Objective:     Vitals:    06/08/22 1048   BP: 110/80   Pulse: 73   Temp: 98.6 °F (37 °C)   TempSrc: Oral   SpO2: 99%   Weight: 111.6 kg (246 lb 0.5 oz)   Height: 6' (1.829 m)   PainSc: 0-No pain     Body mass index is 33.37 kg/m².    Physical Exam  Vitals and nursing note reviewed.   Constitutional:       General: He is not in acute distress.     Appearance: He is well-developed. He is not diaphoretic.   HENT:      Head: Normocephalic and atraumatic.      Right Ear: Hearing, tympanic membrane, ear canal and external ear normal. No tenderness.      Left Ear: Hearing, tympanic membrane, ear canal and external ear normal. No tenderness.      Nose: Nose normal.   Eyes:      General: Lids are normal. No scleral icterus.        Right eye: No discharge.         Left  eye: No discharge.      Extraocular Movements:      Right eye: Normal extraocular motion.      Left eye: Normal extraocular motion.      Conjunctiva/sclera: Conjunctivae normal.      Right eye: Right conjunctiva is not injected.      Left eye: Left conjunctiva is not injected.      Pupils: Pupils are equal, round, and reactive to light.   Neck:      Thyroid: No thyromegaly.      Vascular: No carotid bruit or JVD.      Trachea: No tracheal deviation.   Cardiovascular:      Rate and Rhythm: Normal rate and regular rhythm.      Pulses: Normal pulses.      Heart sounds: Normal heart sounds. No murmur heard.    No friction rub.   Pulmonary:      Effort: Pulmonary effort is normal. No accessory muscle usage or respiratory distress.      Breath sounds: Normal breath sounds. No wheezing, rhonchi or rales.   Abdominal:      General: Bowel sounds are normal. There is no distension or abdominal bruit.      Palpations: Abdomen is soft. There is no mass or pulsatile mass.      Tenderness: There is no abdominal tenderness. There is no guarding or rebound. Negative signs include Henley's sign and McBurney's sign.   Musculoskeletal:      Cervical back: Normal range of motion and neck supple. No edema.   Lymphadenopathy:      Head:      Right side of head: No submandibular, preauricular or posterior auricular adenopathy.      Left side of head: No submandibular, preauricular or posterior auricular adenopathy.      Cervical: No cervical adenopathy.   Skin:     General: Skin is warm and dry.      Findings: No ecchymosis, erythema or rash. Rash is not urticarial.      Nails: There is no clubbing.   Neurological:      Mental Status: He is alert and oriented to person, place, and time.      GCS: GCS eye subscore is 4. GCS verbal subscore is 5. GCS motor subscore is 6.   Psychiatric:         Mood and Affect: Mood is not anxious or depressed. Affect is not angry or inappropriate.         Speech: Speech normal.         Behavior: Behavior  normal. Behavior is cooperative.         Thought Content: Thought content normal.         Assessment and Plan:   Spenser was seen today for annual exam.    Diagnoses and all orders for this visit:    Routine medical exam  -     CBC Without Differential; Future  -     Comprehensive Metabolic Panel; Future  -     Lipid Panel; Future  -     TSH; Future  -     Hemoglobin A1C; Future  -     T4, Free; Future    Chronic right shoulder pain  -     X-ray Shoulder 2 or More Views Right; Future  -     Ambulatory referral/consult to Physical/Occupational Therapy; Future    KALEB (generalized anxiety disorder)    Obesity, unspecified classification, unspecified obesity type, unspecified whether serious comorbidity present    Dermatophytosis of the trunk    Other orders  -     FLUoxetine 20 MG capsule; One po daily  -     ketoconazole (NIZORAL) 2 % cream; Apply topically once daily. X 2wks after shower/bath then prn  -     ketoconazole (NIZORAL) 200 mg Tab; One p.o. daily With food for 2 days; do not drink alcohol or take Tylenol for 7 days  -     meloxicam (MOBIC) 15 MG tablet; Take 1 tablet (15 mg total) by mouth once daily. w food and water for musculoskeletal pain. Do not take more than 30 consecutive days.  -     ketoconazole (NIZORAL) 2 % shampoo; Shampoo, leave on 5 min then wash off and may use reg soap or shampoo. Use daily x 2 wks then 2x wkly as needed      Fresh towel daily.  Meds as above      No follow-ups on file.    THIS NOTE WILL BE SHARED WITH THE PATIENT.

## 2022-06-09 ENCOUNTER — CLINICAL SUPPORT (OUTPATIENT)
Dept: REHABILITATION | Facility: HOSPITAL | Age: 37
End: 2022-06-09
Attending: FAMILY MEDICINE
Payer: COMMERCIAL

## 2022-06-09 DIAGNOSIS — R29.898 DECREASED STRENGTH OF UPPER EXTREMITY: ICD-10-CM

## 2022-06-09 DIAGNOSIS — M25.511 CHRONIC RIGHT SHOULDER PAIN: ICD-10-CM

## 2022-06-09 DIAGNOSIS — G89.29 CHRONIC RIGHT SHOULDER PAIN: ICD-10-CM

## 2022-06-09 PROCEDURE — 97161 PT EVAL LOW COMPLEX 20 MIN: CPT

## 2022-06-09 NOTE — PLAN OF CARE
[facility]   Physical Therapy Initial Evaluation     Date: 6/9/2022   Name: Spenser Alamo  Clinic Number: [id number]    Therapy Diagnosis:   Encounter Diagnoses   Name Primary?    Chronic right shoulder pain     Decreased strength of upper extremity      Physician: Dieter Bianchi MD    Physician Orders: PT Eval and Treat   Medical Diagnosis from Referral: M25.511,G89.29 (ICD-10-CM) - Chronic right shoulder pain  Evaluation Date: 6/9/2022  Authorization Period Expiration: TBD  Plan of Care Expiration: 9/9/2022  Progress Note Due: 7/9/2022  Visit # / Visits authorized: 1/ 1   FOTO: Needs    Precautions: Standard     Time In: 12:29  Time Out: 1:00  Total Appointment Time (timed & untimed codes): 31 minutes      SUBJECTIVE     Date of onset: Chronic - 2 years    History of current condition - Spenser reports: 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 Pt's pain does not stop him but does make it difficult. Pt used to sleep with right arm over head but that it is now to painful.  objects arm painful such as groceries. Pt usually sleeps on right shoulder. Pt's pain was insidious and has progressively become worse. Pain stays after performing movement. Pt is usually 3 or 4 /10 but can get to 8/10.     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      Medical History:   No past medical history on file.    Surgical History:   Spensre Alamo  has a past surgical history that includes Tonsillectomy and [place] tooth extraction  (Bilateral).    Medications:   Spenser has a current medication list which includes the following prescription(s): fluoxetine, hydroxyzine hcl, ketoconazole, ketoconazole, ketoconazole, lactobacillus rhamnosus gg, and meloxicam.    Allergies:   Review of patient's allergies indicates:   Allergen Reactions    Amoxicillin Rash          OBJECTIVE         Cervical AROM: Pain/Dysfunction with Movement:   WNL      Myotome Right Left Pain/Dysfunction with Movement   C4- Shoulder Elevation 5/5 5/5    C5- Shoulder Abduction 3+/5 5/5 R = pain   C6- Wrist Extension 5/5 5/5    C7- Elbow Extension 5/5 5/5        Shoulder Right Right Right Left Left Left Pain/Dysfunction with Movement    AROM PROM MMT AROM PROM MMT    Flexion 180 180 Pain 180 180 5/5 R = Pain at 130 deg   Abduction 180 180 Pain 180 180 5/5    Internal rotation 70 70 Pain 70 70 5/5    External Rotation 90 90 4+/5 90 90 5/5 R= pain at end range     Scapula  Right    MMT   Mid Trap 3+/5   Lower trap 3+/5   Rhomboids  3+/5       GH Accessory    Anterior normal   Posterior normal   Inferior normal       Posture Assessment: rounded shoulders, decreased pec minor length    Wright-Edmond: R+    Painful-Arc: R+    Infraspinatus Test: -    Drop Arm: R pain    Lift Off Belly Test: R +    Horizontal Adduction Test: R+    Anterior Apprehension/Relocation: R+    Bicep load test: -     Lateral Slide Test: -        Limitation/Restriction for FOTO Shoulder Survey    Therapist reviewed FOTO scores for Spenser Alamo on 6/9/2022.   FOTO documents entered into [employer] - see Media section.    Limitation Score: TBD%         TREATMENT       PATIENT EDUCATION AND HOME EXERCISES     Education provided:   - HEP, PT role     Written Home Exercises Provided: yes. Exercises were reviewed and Spenser was able to demonstrate them prior to the end of the session.  Spenser demonstrated good  understanding of the education provided. See EMR under Patient Instructions for exercises provided  during therapy sessions.    ASSESSMENT     Spenser is a 36 y.o. male referred to outpatient Physical Therapy with a medical diagnosis of M25.511,G89.29 (ICD-10-CM) - Chronic right shoulder pain . Patient presents with     - Decrease right shoulder pain free range of motion  - Decreased right shoulder strength  - Decreased scapular stabilizer strength    - Decreased Pec Minor length on Right  - Positive Wright-Edmond, Painful-Arc, Infraspinatus Test, Drop Arm (pain), Lift Off Belly Test, Horizontal Adduction Test, Anterior Apprehension     Pt is 35 y/o male who presents to PT with chronic shoulder pain. Pt has pain with movements of the right upper extremity and slight pain with passive movements of the right upper extremity. Pt impairments listed above leading to decreased quality of life and difficulty performing job related duties. Pt needs to increased strength of scapular stabilizers to decrease pain of right shoulder with movement.       Patient prognosis is Good.   Patient will benefit from skilled outpatient Physical Therapy to address the deficits stated above and in the chart below, provide patient /family education, and to maximize patientt's level of independence.     Plan of care discussed with patient: Yes  Patient's spiritual, cultural and educational needs considered and patient is agreeable to the plan of care and goals as stated below:     Anticipated Barriers for therapy: Work    Medical Necessity is demonstrated by the following  History  Co-morbidities and personal factors that may impact the plan of care Co-morbidities:   See medical History    Personal Factors:   no deficits     low   Examination  Body Structures and Functions, activity limitations and participation restrictions that may impact the plan of care Body Regions:   upper extremities    Body Systems:    ROM  strength  motor control    Participation Restrictions:   Exercising     Activity limitations:   Learning and applying  knowledge  no deficits    General Tasks and Commands  no deficits    Communication  no deficits    Mobility  lifting and carrying objects    Self care  no deficits    Domestic Life  no deficits    Interactions/Relationships  no deficits    Life Areas  no deficits    Community and Social Life  no deficits         low   Clinical Presentation stable and uncomplicated low   Decision Making/ Complexity Score: low     Goals:  Short Term Goals (4 Weeks):   1. Pt will be independent with HEP to supplement PT in improving functional use of R UE.  2. Pt will increase Right pain free shoulder elevation AROM to >/= 160 deg to improve functional mobility of UE  3. Pt will perform Right shoulder ER PROM in 90 deg abduction without pain in order to improve functional mobility of UE  Long Term Goals (8 Weeks):   1. Pt will improve FOTO score to </=30% limited to decrease perceived limitation with carrying, moving, and handling objects.  2. Pt will increase R shoulder AROM to WFL pain free in all planes to improve functional use of R RUE.  3. Pt will improve R shoulder MMTs to = 5/5 to promote equal use of B UEs in performing functional tasks.  4. Pt will lift 25 lb objects without pain to promote functional QOL.  5. Pt to report pain </= 0/10 with ADLs and IADLs using right UE to improve functional QOL.    PLAN   Plan of care Certification: 6/9/2022 to 9/9/2022.    Outpatient Physical Therapy 2 times weekly for 10 weeks to include the following interventions: Electrical Stimulation IFC, Tens, Dry needling, Gait Training, Manual Therapy, Moist Heat/ Ice, Neuromuscular Re-ed, Patient Education, Therapeutic Activities and Therapeutic Exercise.     Jose Guadalupe Gould, PT      I CERTIFY THE NEED FOR THESE SERVICES FURNISHED UNDER THIS PLAN OF TREATMENT AND WHILE UNDER MY CARE   Physician's comments:     Physician's Signature: ___________________________________________________

## 2022-06-10 PROBLEM — M25.511 CHRONIC RIGHT SHOULDER PAIN: Status: ACTIVE | Noted: 2022-06-10

## 2022-06-10 PROBLEM — B36.0 TINEA VERSICOLOR: Status: ACTIVE | Noted: 2022-06-10

## 2022-06-10 PROBLEM — B35.4: Status: ACTIVE | Noted: 2022-06-10

## 2022-06-10 PROBLEM — B36.0 TINEA VERSICOLOR: Status: RESOLVED | Noted: 2022-06-10 | Resolved: 2022-06-10

## 2022-06-10 PROBLEM — G89.29 CHRONIC RIGHT SHOULDER PAIN: Status: ACTIVE | Noted: 2022-06-10

## 2022-06-15 ENCOUNTER — CLINICAL SUPPORT (OUTPATIENT)
Dept: REHABILITATION | Facility: HOSPITAL | Age: 37
End: 2022-06-15
Attending: FAMILY MEDICINE
Payer: COMMERCIAL

## 2022-06-15 ENCOUNTER — HOSPITAL ENCOUNTER (OUTPATIENT)
Dept: RADIOLOGY | Facility: HOSPITAL | Age: 37
Discharge: HOME | End: 2022-06-15
Attending: FAMILY MEDICINE
Payer: COMMERCIAL

## 2022-06-15 DIAGNOSIS — M25.511 CHRONIC RIGHT SHOULDER PAIN: ICD-10-CM

## 2022-06-15 DIAGNOSIS — G89.29 CHRONIC RIGHT SHOULDER PAIN: ICD-10-CM

## 2022-06-15 DIAGNOSIS — R29.898 DECREASED STRENGTH OF UPPER EXTREMITY: Primary | ICD-10-CM

## 2022-06-15 PROCEDURE — 73030 XR SHOULDER COMPLETE 2 OR MORE VIEWS RIGHT: ICD-10-PCS | Mod: 26,RT,, | Performed by: RADIOLOGY

## 2022-06-15 PROCEDURE — 73030 X-RAY EXAM OF SHOULDER: CPT | Mod: TC,PN,RT

## 2022-06-15 PROCEDURE — 73030 X-RAY EXAM OF SHOULDER: CPT | Mod: 26,RT,, | Performed by: RADIOLOGY

## 2022-06-15 PROCEDURE — 97140 MANUAL THERAPY 1/> REGIONS: CPT | Mod: 97 | Performed by: PHYSICAL THERAPIST

## 2022-06-15 PROCEDURE — 97110 THERAPEUTIC EXERCISES: CPT | Mod: 97 | Performed by: PHYSICAL THERAPIST

## 2022-06-15 NOTE — PROGRESS NOTES
"  Physical Therapy Daily Treatment Note     Name: Spenser Alamo  Clinic Number: [id number]    Therapy Diagnosis:   Encounter Diagnosis   Name Primary?    Decreased strength of upper extremity Yes     Physician: Dieter Bianchi MD    Visit Date: 6/15/2022  Physician Orders: PT Eval and Treat   Medical Diagnosis from Referral: M25.511,G89.29 (ICD-10-CM) - Chronic right shoulder pain  Evaluation Date: 6/9/2022  Authorization Period Expiration: TBD  Plan of Care Expiration: 9/9/2022  Visit # / Visits authorized: 1/ 40    Time In: 3:55 pm  Time Out: 4:45 pm  Total Billable Time: 45 minutes    Precautions: Standard    Subjective     Pt reports: R shld is sore in area of middle deltoid, especially with lifting overhead.  He was compliant with home exercise program.  Response to previous treatment: na  Functional change: na    Pain: 4/10  Location: right shoulder      Objective     TTP middle deltoid.  Painful arc noted with flexion and abduction.  Gross strength is 4+/5 abd with pain and 5/5 ER.  Pain with passive IR.    Spenser received therapeutic exercises to develop strength, ROM and flexibility for 45 minutes including:  UBE x 6 min  MOBS/PROM x 7 min  GTB x 3 ways  Prone rows with 5#  Serratus punches with 5#  Ball on wall co-contractions  Manual co-contractions  Dry needling R deltoid with 3 1" needles after verbal consent given  KT taping with I strip and 60% tension for deltoid inhibition  HEP review and given GTB    Home Exercises Provided and Patient Education Provided     Education provided:   - ice for pain    Written Home Exercises Provided: Patient instructed to cont prior HEP.  Exercises were reviewed and Spenser was able to demonstrate them prior to the end of the session.  Spenser demonstrated good  understanding of the education provided.     See EMR under Patient Instructions for exercises provided 6/15/2022.    Assessment     Feel pt has impingement syndrome.  Tolerated DN and exercises well " with less pain afterwards.  Spenser Is progressing well towards his goals.   Pt prognosis is Good.     Pt will continue to benefit from skilled outpatient physical therapy to address the deficits listed in the problem list box on initial evaluation, provide pt/family education and to maximize pt's level of independence in the home and community environment.     Pt's spiritual, cultural and educational needs considered and pt agreeable to plan of care and goals.    Anticipated barriers to physical therapy: none    Short Term Goals (4 Weeks):   1. Pt will be independent with HEP to supplement PT in improving functional use of R UE.  2. Pt will increase Right pain free shoulder elevation AROM to >/= 160 deg to improve functional mobility of UE  3. Pt will perform Right shoulder ER PROM in 90 deg abduction without pain in order to improve functional mobility of UE  Long Term Goals (8 Weeks):   1. Pt will improve FOTO score to </=30% limited to decrease perceived limitation with carrying, moving, and handling objects.  2. Pt will increase R shoulder AROM to WFL pain free in all planes to improve functional use of R RUE.  3. Pt will improve R shoulder MMTs to = 5/5 to promote equal use of B UEs in performing functional tasks.  4. Pt will lift 25 lb objects without pain to promote functional QOL.  5. Pt to report pain </= 0/10 with ADLs and IADLs using right UE to improve functional QOL.          Plan     Continue PT per POC.      Silver Owen, PT

## 2022-06-22 ENCOUNTER — CLINICAL SUPPORT (OUTPATIENT)
Dept: REHABILITATION | Facility: HOSPITAL | Age: 37
End: 2022-06-22
Attending: FAMILY MEDICINE
Payer: COMMERCIAL

## 2022-06-22 DIAGNOSIS — R29.898 DECREASED STRENGTH OF UPPER EXTREMITY: Primary | ICD-10-CM

## 2022-06-22 PROCEDURE — 97140 MANUAL THERAPY 1/> REGIONS: CPT | Mod: 97 | Performed by: PHYSICAL THERAPIST

## 2022-06-22 PROCEDURE — 97110 THERAPEUTIC EXERCISES: CPT | Performed by: PHYSICAL THERAPIST

## 2022-06-22 NOTE — PROGRESS NOTES
"  Physical Therapy Daily Treatment Note     Name: Spenser Alamo  Clinic Number: [id number]    Therapy Diagnosis:   Encounter Diagnosis   Name Primary?    Decreased strength of upper extremity Yes     Physician: Dieter Bianchi MD    Visit Date: 6/22/2022  Physician Orders: PT Eval and Treat   Medical Diagnosis from Referral: M25.511,G89.29 (ICD-10-CM) - Chronic right shoulder pain  Evaluation Date: 6/9/2022  Authorization Period Expiration: TBD  Plan of Care Expiration: 9/9/2022  Visit # / Visits authorized: 2/ 40    Time In: 4:00 pm  Time Out: 4:50 pm  Total Billable Time: 50 minutes    Precautions: Standard    Subjective     Pt reports: R shld feels better.  Liked KT tape.  He was compliant with home exercise program.  Response to previous treatment: less pain  Functional change: improved ADL    Pain: 0/10  Location: right shoulder      Objective     MIN TTP middle deltoid.  Pain at end range passive flexion and abduction.  Gross strength is 4+/5 abd with pain and 5/5 ER.      Spenser received therapeutic exercises to develop strength, ROM and flexibility for 50 minutes including:  UBE x 6 min  MOBS/PROM x 7 min  GTB x 3 ways  Prone rows with 5#  Serratus punches with 5#  Prone HABD  Ball on wall co-contractions  Manual co-contractions  Dry needling R deltoid with 3 1" needles after verbal consent given  KT taping with I strip and 60% tension for deltoid inhibition  HEP review and given GTB    Home Exercises Provided and Patient Education Provided     Education provided:   - ice for pain    Written Home Exercises Provided: Patient instructed to cont prior HEP.  Exercises were reviewed and Spenser was able to demonstrate them prior to the end of the session.  Spenser demonstrated good  understanding of the education provided.     See EMR under Patient Instructions for exercises provided 6/15/2022.    Assessment     Less pain.  Tolerated DN and exercises well.  Spenser Is progressing well towards his goals. "   Pt prognosis is Good.     Pt will continue to benefit from skilled outpatient physical therapy to address the deficits listed in the problem list box on initial evaluation, provide pt/family education and to maximize pt's level of independence in the home and community environment.     Pt's spiritual, cultural and educational needs considered and pt agreeable to plan of care and goals.    Anticipated barriers to physical therapy: none    Short Term Goals (4 Weeks):   1. Pt will be independent with HEP to supplement PT in improving functional use of R UE.  2. Pt will increase Right pain free shoulder elevation AROM to >/= 160 deg to improve functional mobility of UE  3. Pt will perform Right shoulder ER PROM in 90 deg abduction without pain in order to improve functional mobility of UE  Long Term Goals (8 Weeks):   1. Pt will improve FOTO score to </=30% limited to decrease perceived limitation with carrying, moving, and handling objects.  2. Pt will increase R shoulder AROM to WFL pain free in all planes to improve functional use of R RUE.  3. Pt will improve R shoulder MMTs to = 5/5 to promote equal use of B UEs in performing functional tasks.  4. Pt will lift 25 lb objects without pain to promote functional QOL.  5. Pt to report pain </= 0/10 with ADLs and IADLs using right UE to improve functional QOL.          Plan     Continue PT per POC.      Silver Owen, PT

## 2022-06-29 ENCOUNTER — CLINICAL SUPPORT (OUTPATIENT)
Dept: REHABILITATION | Facility: HOSPITAL | Age: 37
End: 2022-06-29
Attending: FAMILY MEDICINE
Payer: COMMERCIAL

## 2022-06-29 DIAGNOSIS — R29.898 DECREASED STRENGTH OF UPPER EXTREMITY: Primary | ICD-10-CM

## 2022-06-29 PROCEDURE — 97110 THERAPEUTIC EXERCISES: CPT | Performed by: PHYSICAL THERAPIST

## 2022-06-29 NOTE — PROGRESS NOTES
Physical Therapy Daily Treatment Note     Name: Spenser Alamo  Clinic Number: [id number]    Therapy Diagnosis:   Encounter Diagnosis   Name Primary?    Decreased strength of upper extremity Yes     Physician: Dieter Bianchi MD    Visit Date: 6/29/2022  Physician Orders: PT Eval and Treat   Medical Diagnosis from Referral: M25.511,G89.29 (ICD-10-CM) - Chronic right shoulder pain  Evaluation Date: 6/9/2022  Authorization Period Expiration: TBD  Plan of Care Expiration: 9/9/2022  Visit # / Visits authorized: 3/ 40    Time In: 3:55 pm  Time Out: 4:45 pm  Total Billable Time: 40 minutes    Precautions: Standard    Subjective     Pt reports: ADAL stanton feels better.    He was compliant with home exercise program.  Response to previous treatment: less pain  Functional change: improved ADL    Pain: 0/10  Location: right shoulder      Objective     MIN TTP middle deltoid.  Pain at end range passive flexion and abduction.  Gross strength is 4+/5 abd with pain and 5/5 ER.  PROM is WNL.    Spenser received therapeutic exercises to develop strength, ROM and flexibility for 50 minutes including:  UBE x 6 min  MOBS/PROM x 7 min  GTB x 3 ways  Prone rows with 5#  Serratus punches with 5#  Prone HABD  Prone scap retractions with 5#  YTB wall walks  Ball on wall co-contractions  Manual co-contractions  KT taping with I strip and 60% tension for deltoid inhibition  HEP review and given GTB  CP x 10 min    Home Exercises Provided and Patient Education Provided     Education provided:   - ice for pain    Written Home Exercises Provided: Patient instructed to cont prior HEP.  Exercises were reviewed and Spenser was able to demonstrate them prior to the end of the session.  Spenser demonstrated good  understanding of the education provided.     See EMR under Patient Instructions for exercises provided 6/15/2022.    Assessment     MIN pain.  Tolerated exercises well.  Spenser Is progressing well towards his goals.   Pt prognosis is  Good.     Pt will continue to benefit from skilled outpatient physical therapy to address the deficits listed in the problem list box on initial evaluation, provide pt/family education and to maximize pt's level of independence in the home and community environment.     Pt's spiritual, cultural and educational needs considered and pt agreeable to plan of care and goals.    Anticipated barriers to physical therapy: none    Short Term Goals (4 Weeks):   1. Pt will be independent with HEP to supplement PT in improving functional use of R UE.  2. Pt will increase Right pain free shoulder elevation AROM to >/= 160 deg to improve functional mobility of UE  3. Pt will perform Right shoulder ER PROM in 90 deg abduction without pain in order to improve functional mobility of UE  Long Term Goals (8 Weeks):   1. Pt will improve FOTO score to </=30% limited to decrease perceived limitation with carrying, moving, and handling objects.  2. Pt will increase R shoulder AROM to WFL pain free in all planes to improve functional use of R RUE.  3. Pt will improve R shoulder MMTs to = 5/5 to promote equal use of B UEs in performing functional tasks.  4. Pt will lift 25 lb objects without pain to promote functional QOL.  5. Pt to report pain </= 0/10 with ADLs and IADLs using right UE to improve functional QOL.          Plan     Continue PT per POC.      Silver Owen, PT

## 2022-07-08 ENCOUNTER — CLINICAL SUPPORT (OUTPATIENT)
Dept: REHABILITATION | Facility: HOSPITAL | Age: 37
End: 2022-07-08
Attending: FAMILY MEDICINE
Payer: COMMERCIAL

## 2022-07-08 DIAGNOSIS — R29.898 DECREASED STRENGTH OF UPPER EXTREMITY: Primary | ICD-10-CM

## 2022-07-08 PROCEDURE — 97140 MANUAL THERAPY 1/> REGIONS: CPT | Performed by: PHYSICAL THERAPIST

## 2022-07-08 PROCEDURE — 97110 THERAPEUTIC EXERCISES: CPT | Performed by: PHYSICAL THERAPIST

## 2022-07-08 NOTE — PROGRESS NOTES
"  Physical Therapy Daily Treatment Note     Name: Spenser Alamo  Clinic Number: [id number]    Therapy Diagnosis:   No diagnosis found.  Physician: Dieter Bianchi MD    Visit Date: 7/8/2022  Physician Orders: PT Eval and Treat   Medical Diagnosis from Referral: M25.511,G89.29 (ICD-10-CM) - Chronic right shoulder pain  Evaluation Date: 6/9/2022  Authorization Period Expiration: TBD  Plan of Care Expiration: 9/9/2022  Visit # / Visits authorized: 4/ 40    Time In: 3:50 pm  Time Out: 4:40 pm  Total Billable Time: 45 minutes    Precautions: Standard    Subjective     Pt reports: ADAL stanton feels better.    He was compliant with home exercise program.  Response to previous treatment: less pain  Functional change: improved ADL    Pain: 0/10  Location: right shoulder      Objective     MIN TTP middle deltoid.  Pain at end range passive flexion.  Gross strength is 4+/5 abd with pain and 5/5 ER.  PROM is WNL.    Spenser received therapeutic exercises to develop strength, ROM and flexibility for 50 minutes including:  UBE x 8 min  MOBS/PROM x 7 min  GTB x 3 ways  Prone rows with 5#  Serratus punches with 5#  Prone HABD  Prone scap retractions with 5#  YTB wall walks  Ball on wall co-contractions  Manual co-contractions  Dry needling middle deltoid with 3 1" needles  KT taping with I strip and 60% tension for deltoid inhibition  HEP review and given GTB  CP x 10 min    Home Exercises Provided and Patient Education Provided     Education provided:   - ice for pain    Written Home Exercises Provided: Patient instructed to cont prior HEP.  Exercises were reviewed and Spenser was able to demonstrate them prior to the end of the session.  Spenser demonstrated good  understanding of the education provided.     See EMR under Patient Instructions for exercises provided 6/15/2022.    Assessment     Tolerated exercises and DN well.  Spenser Is progressing well towards his goals.   Pt prognosis is Good.     Pt will continue to benefit " from skilled outpatient physical therapy to address the deficits listed in the problem list box on initial evaluation, provide pt/family education and to maximize pt's level of independence in the home and community environment.     Pt's spiritual, cultural and educational needs considered and pt agreeable to plan of care and goals.    Anticipated barriers to physical therapy: none    Short Term Goals (4 Weeks):   1. Pt will be independent with HEP to supplement PT in improving functional use of R UE.  2. Pt will increase Right pain free shoulder elevation AROM to >/= 160 deg to improve functional mobility of UE  3. Pt will perform Right shoulder ER PROM in 90 deg abduction without pain in order to improve functional mobility of UE  Long Term Goals (8 Weeks):   1. Pt will improve FOTO score to </=30% limited to decrease perceived limitation with carrying, moving, and handling objects.  2. Pt will increase R shoulder AROM to WFL pain free in all planes to improve functional use of R RUE.  3. Pt will improve R shoulder MMTs to = 5/5 to promote equal use of B UEs in performing functional tasks.  4. Pt will lift 25 lb objects without pain to promote functional QOL.  5. Pt to report pain </= 0/10 with ADLs and IADLs using right UE to improve functional QOL.          Plan     Continue PT per POC.      Silver Owen, PT

## 2022-07-13 ENCOUNTER — CLINICAL SUPPORT (OUTPATIENT)
Dept: REHABILITATION | Facility: HOSPITAL | Age: 37
End: 2022-07-13
Attending: FAMILY MEDICINE
Payer: COMMERCIAL

## 2022-07-13 DIAGNOSIS — R29.898 DECREASED STRENGTH OF UPPER EXTREMITY: Primary | ICD-10-CM

## 2022-07-13 PROCEDURE — 97140 MANUAL THERAPY 1/> REGIONS: CPT | Mod: 97 | Performed by: PHYSICAL THERAPIST

## 2022-07-13 PROCEDURE — 97110 THERAPEUTIC EXERCISES: CPT | Performed by: PHYSICAL THERAPIST

## 2022-07-13 NOTE — PROGRESS NOTES
"  Physical Therapy Daily Treatment Note     Name: Spenser Alamo  Clinic Number: [id number]    Therapy Diagnosis:   Encounter Diagnosis   Name Primary?    Decreased strength of upper extremity Yes     Physician: Dieter Bianchi MD    Visit Date: 7/13/2022  Physician Orders: PT Eval and Treat   Medical Diagnosis from Referral: M25.511,G89.29 (ICD-10-CM) - Chronic right shoulder pain  Evaluation Date: 6/9/2022  Authorization Period Expiration: TBD  Plan of Care Expiration: 9/9/2022  Visit # / Visits authorized: 5/ 40    Time In: 3:55 pm  Time Out: 4:45 pm  Total Billable Time: 50 minutes    Precautions: Standard    Subjective     Pt reports: ADAL stanton feels sore since not taking NSAIDs anymore.    He was compliant with home exercise program.  Response to previous treatment: less pain  Functional change: improved ADL    Pain: 0/10  Location: right shoulder      Objective     MIN TTP middle deltoid.  Pain at end range passive flexion.  Gross strength is 4+/5 abd with pain and 5/5 ER.  PROM is WNL.    Spenser received therapeutic exercises to develop strength, ROM and flexibility for 50 minutes including:  UBE x 8 min  MOBS/PROM x 7 min  GTB x 3 ways  Prone rows with 5#  Serratus punches with 5#  Prone HABD  Prone scap retractions with 5#  YTB wall walks  YTB clock  Ball on wall co-contractions  Manual co-contractions  Cable cross rows with 10#  Dry needling middle deltoid with 3 1" needles  KT taping with I strip and 60% tension for deltoid inhibition  HEP review and given GTB  CP x 10 min    Home Exercises Provided and Patient Education Provided     Education provided:   - ice for pain    Written Home Exercises Provided: Patient instructed to cont prior HEP.  Exercises were reviewed and Spenser was able to demonstrate them prior to the end of the session.  Spenser demonstrated good  understanding of the education provided.     See EMR under Patient Instructions for exercises provided 6/15/2022.    Assessment "     Tolerated exercises and DN well.  No c/o pain with treatment.  Spenser Is progressing well towards his goals.   Pt prognosis is Good.     Pt will continue to benefit from skilled outpatient physical therapy to address the deficits listed in the problem list box on initial evaluation, provide pt/family education and to maximize pt's level of independence in the home and community environment.     Pt's spiritual, cultural and educational needs considered and pt agreeable to plan of care and goals.    Anticipated barriers to physical therapy: none    Short Term Goals (4 Weeks):   1. Pt will be independent with HEP to supplement PT in improving functional use of R UE.  2. Pt will increase Right pain free shoulder elevation AROM to >/= 160 deg to improve functional mobility of UE  3. Pt will perform Right shoulder ER PROM in 90 deg abduction without pain in order to improve functional mobility of UE  Long Term Goals (8 Weeks):   1. Pt will improve FOTO score to </=30% limited to decrease perceived limitation with carrying, moving, and handling objects.  2. Pt will increase R shoulder AROM to WFL pain free in all planes to improve functional use of R RUE.  3. Pt will improve R shoulder MMTs to = 5/5 to promote equal use of B UEs in performing functional tasks.  4. Pt will lift 25 lb objects without pain to promote functional QOL.  5. Pt to report pain </= 0/10 with ADLs and IADLs using right UE to improve functional QOL.          Plan     Continue PT per POC.      Silver Owen, PT

## 2022-07-20 ENCOUNTER — CLINICAL SUPPORT (OUTPATIENT)
Dept: REHABILITATION | Facility: HOSPITAL | Age: 37
End: 2022-07-20
Attending: FAMILY MEDICINE
Payer: COMMERCIAL

## 2022-07-20 DIAGNOSIS — R29.898 DECREASED STRENGTH OF UPPER EXTREMITY: Primary | ICD-10-CM

## 2022-07-20 PROCEDURE — 97110 THERAPEUTIC EXERCISES: CPT | Mod: 97 | Performed by: PHYSICAL THERAPIST

## 2022-07-20 PROCEDURE — 97140 MANUAL THERAPY 1/> REGIONS: CPT | Mod: 97 | Performed by: PHYSICAL THERAPIST

## 2022-07-20 NOTE — PROGRESS NOTES
"  Physical Therapy Daily Treatment Note     Name: Spenser Alamo  Clinic Number: [id number]    Therapy Diagnosis:   Encounter Diagnosis   Name Primary?    Decreased strength of upper extremity Yes     Physician: Dieter Bianchi MD    Visit Date: 7/20/2022  Physician Orders: PT Eval and Treat   Medical Diagnosis from Referral: M25.511,G89.29 (ICD-10-CM) - Chronic right shoulder pain  Evaluation Date: 6/9/2022  Authorization Period Expiration: TBD  Plan of Care Expiration: 9/9/2022  Visit # / Visits authorized: 6/ 40    Time In: 3:10 pm  Time Out: 4:00 pm  Total Billable Time: 50 minutes    Precautions: Standard    Subjective     Pt reports: ADAL stanton feels real good of late.  He was compliant with home exercise program.  Response to previous treatment: less pain  Functional change: improved ADL    Pain: 0/10  Location: right shoulder      Objective     MIN TTP middle deltoid.  Pain at end range passive flexion.  Gross strength is 4+/5 abd with pain and 5/5 ER.  PROM is WNL.    Spenser received therapeutic exercises to develop strength, ROM and flexibility for 50 minutes including:  UBE x 8 min  MOBS/PROM x 7 min  GTB x 3 ways  Prone rows with 5#  Serratus punches with 5#  Prone HABD  Prone scap retractions with 5#  YTB wall walks  YTB clock  Ball on wall co-contractions  Manual co-contractions  Cable cross rows with 10#  Dry needling middle deltoid with 3 1" needles  KT taping with I strip and 60% tension for deltoid inhibition  HEP review and given GTB  CP x 10 min    Home Exercises Provided and Patient Education Provided     Education provided:   - ice for pain    Written Home Exercises Provided: Patient instructed to cont prior HEP.  Exercises were reviewed and Spenser was able to demonstrate them prior to the end of the session.  Spenser demonstrated good  understanding of the education provided.     See EMR under Patient Instructions for exercises provided 6/15/2022.    Assessment     Tolerated exercises and " DN well.  Sx decreased with ADL..  Spenser Is progressing well towards his goals.   Pt prognosis is Good.     Pt will continue to benefit from skilled outpatient physical therapy to address the deficits listed in the problem list box on initial evaluation, provide pt/family education and to maximize pt's level of independence in the home and community environment.     Pt's spiritual, cultural and educational needs considered and pt agreeable to plan of care and goals.    Anticipated barriers to physical therapy: none    Short Term Goals (4 Weeks):   1. Pt will be independent with HEP to supplement PT in improving functional use of R UE.  2. Pt will increase Right pain free shoulder elevation AROM to >/= 160 deg to improve functional mobility of UE  3. Pt will perform Right shoulder ER PROM in 90 deg abduction without pain in order to improve functional mobility of UE  Long Term Goals (8 Weeks):   1. Pt will improve FOTO score to </=30% limited to decrease perceived limitation with carrying, moving, and handling objects.  2. Pt will increase R shoulder AROM to WFL pain free in all planes to improve functional use of R RUE.  3. Pt will improve R shoulder MMTs to = 5/5 to promote equal use of B UEs in performing functional tasks.  4. Pt will lift 25 lb objects without pain to promote functional QOL.  5. Pt to report pain </= 0/10 with ADLs and IADLs using right UE to improve functional QOL.          Plan     Continue PT per POC.      Silver wOen, PT

## 2022-08-03 ENCOUNTER — CLINICAL SUPPORT (OUTPATIENT)
Dept: REHABILITATION | Facility: HOSPITAL | Age: 37
End: 2022-08-03
Attending: FAMILY MEDICINE
Payer: COMMERCIAL

## 2022-08-03 DIAGNOSIS — R29.898 DECREASED STRENGTH OF UPPER EXTREMITY: Primary | ICD-10-CM

## 2022-08-03 PROCEDURE — 97110 THERAPEUTIC EXERCISES: CPT | Mod: 97 | Performed by: PHYSICAL THERAPIST

## 2022-08-03 NOTE — PROGRESS NOTES
Physical Therapy Daily Treatment Note     Name: Spenser Alamo  Clinic Number: [id number]    Therapy Diagnosis:   Encounter Diagnosis   Name Primary?    Decreased strength of upper extremity Yes     Physician: Dieter Bianchi MD    Visit Date: 8/3/2022  Physician Orders: PT Eval and Treat   Medical Diagnosis from Referral: M25.511,G89.29 (ICD-10-CM) - Chronic right shoulder pain  Evaluation Date: 6/9/2022  Authorization Period Expiration: TBD  Plan of Care Expiration: 9/9/2022  Visit # / Visits authorized: 7/ 40    Time In: 3:10 pm  Time Out: 4:00 pm  Total Billable Time: 50 minutes    Precautions: Standard    Subjective     Pt reports: ADAL stanton feels real good.  He was compliant with home exercise program.  Response to previous treatment: less pain  Functional change: improved ADL    Pain: 0/10  Location: right shoulder      Objective     No TTP middle deltoid.  AROM is WNL without pain.  Gross strength is 4+/5 abd with pain and 5/5 ER.  PROM is WNL without pain.    Spenser received therapeutic exercises to develop strength, ROM and flexibility for 50 minutes including:  UBE x 8 min  MOBS/PROM x 8 min  GTB x 3 ways  Prone rows with 5#  Serratus punches with 5#  Prone HABD  Prone scap retractions with 5#  YTB wall walks  YTB clock  Ball on wall co-contractions  Manual co-contractions  Cable cross rows with 10#  HEP review   CP x 10 min    Home Exercises Provided and Patient Education Provided     Education provided:   - ice for pain    Written Home Exercises Provided: Patient instructed to cont prior HEP.  Exercises were reviewed and Spenser was able to demonstrate them prior to the end of the session.  Spenser demonstrated good  understanding of the education provided.     See EMR under Patient Instructions for exercises provided 6/15/2022.    Assessment     Tolerated exercises well.  Functioning well.  Spenser Is progressing well towards his goals.   Pt prognosis is Good.     Pt will continue to benefit from  skilled outpatient physical therapy to address the deficits listed in the problem list box on initial evaluation, provide pt/family education and to maximize pt's level of independence in the home and community environment.     Pt's spiritual, cultural and educational needs considered and pt agreeable to plan of care and goals.    Anticipated barriers to physical therapy: none    Short Term Goals (4 Weeks):   1. Pt will be independent with HEP to supplement PT in improving functional use of R UE.  2. Pt will increase Right pain free shoulder elevation AROM to >/= 160 deg to improve functional mobility of UE  3. Pt will perform Right shoulder ER PROM in 90 deg abduction without pain in order to improve functional mobility of UE  Long Term Goals (8 Weeks):   1. Pt will improve FOTO score to </=30% limited to decrease perceived limitation with carrying, moving, and handling objects.  2. Pt will increase R shoulder AROM to WFL pain free in all planes to improve functional use of R RUE.  3. Pt will improve R shoulder MMTs to = 5/5 to promote equal use of B UEs in performing functional tasks.  4. Pt will lift 25 lb objects without pain to promote functional QOL.  5. Pt to report pain </= 0/10 with ADLs and IADLs using right UE to improve functional QOL.          Plan     Probable d/c next week.      Silver Owen, PT

## 2022-08-10 ENCOUNTER — CLINICAL SUPPORT (OUTPATIENT)
Dept: REHABILITATION | Facility: HOSPITAL | Age: 37
End: 2022-08-10
Attending: FAMILY MEDICINE
Payer: COMMERCIAL

## 2022-08-10 DIAGNOSIS — R29.898 DECREASED STRENGTH OF UPPER EXTREMITY: Primary | ICD-10-CM

## 2022-08-10 PROCEDURE — 97110 THERAPEUTIC EXERCISES: CPT | Performed by: PHYSICAL THERAPIST

## 2022-08-10 NOTE — PROGRESS NOTES
Physical Therapy Daily Treatment Note     Name: Spenser Alamo  Clinic Number: [id number]    Therapy Diagnosis:   Encounter Diagnosis   Name Primary?    Decreased strength of upper extremity Yes     Physician: Dieter Bianchi MD    Visit Date: 8/10/2022  Physician Orders: PT Eval and Treat   Medical Diagnosis from Referral: M25.511,G89.29 (ICD-10-CM) - Chronic right shoulder pain  Evaluation Date: 6/9/2022  Authorization Period Expiration: TBD  Plan of Care Expiration: 9/9/2022  Visit # / Visits authorized: 8/ 40    Time In: 4:00 pm  Time Out: 4:50 pm  Total Billable Time: 40 minutes    Precautions: Standard    Subjective     Pt reports: R shld feels real good.  He was compliant with home exercise program.  Response to previous treatment: no pain  Functional change: improved ADL    Pain: 0/10  Location: right shoulder      Objective     No TTP middle deltoid.  AROM is WNL without pain.  Gross strength is 5/5 R shld.  Good scapula stability.    Spenser received therapeutic exercises to develop strength, ROM and flexibility for 50 minutes including:  UBE x 8 min  MOBS/PROM x 8 min  GTB x 3 ways  Prone rows with 5#  Serratus punches with 5#  Prone HABD  Prone scap retractions with 5#  YTB wall walks  YTB clock  Ball on wall co-contractions  Manual co-contractions  Cable cross rows with 10#  HEP review   CP x 10 min    Home Exercises Provided and Patient Education Provided     Education provided:   - ice for pain    Written Home Exercises Provided: Patient instructed to cont prior HEP.  Exercises were reviewed and Spenser was able to demonstrate them prior to the end of the session.  Spenser demonstrated good  understanding of the education provided.     See EMR under Patient Instructions for exercises provided 6/15/2022.    Assessment     Tolerated exercises well.  Functioning well and without pain.      Plan     D/C PT with a HEP secondary to pt meting all goals.      Silver Owen, PT

## 2022-09-17 ENCOUNTER — IMMUNIZATION (OUTPATIENT)
Dept: INTERNAL MEDICINE | Facility: CLINIC | Age: 37
End: 2022-09-17
Payer: COMMERCIAL

## 2022-09-17 PROCEDURE — 90471 IMMUNIZATION ADMIN: CPT | Mod: S$GLB,,, | Performed by: INTERNAL MEDICINE

## 2022-09-17 PROCEDURE — 90471 FLU VACCINE (QUAD) GREATER THAN OR EQUAL TO 3YO PRESERVATIVE FREE IM: ICD-10-PCS | Mod: S$GLB,,, | Performed by: INTERNAL MEDICINE

## 2022-09-17 PROCEDURE — 90686 IIV4 VACC NO PRSV 0.5 ML IM: CPT | Mod: S$GLB,,, | Performed by: INTERNAL MEDICINE

## 2022-09-17 PROCEDURE — 90686 FLU VACCINE (QUAD) GREATER THAN OR EQUAL TO 3YO PRESERVATIVE FREE IM: ICD-10-PCS | Mod: S$GLB,,, | Performed by: INTERNAL MEDICINE

## 2022-09-21 ENCOUNTER — IMMUNIZATION (OUTPATIENT)
Dept: PHARMACY | Facility: CLINIC | Age: 37
End: 2022-09-21
Payer: COMMERCIAL

## 2022-09-21 DIAGNOSIS — Z23 NEED FOR VACCINATION: Primary | ICD-10-CM

## 2022-11-01 ENCOUNTER — CLINICAL SUPPORT (OUTPATIENT)
Dept: OTHER | Facility: CLINIC | Age: 37
End: 2022-11-01
Payer: COMMERCIAL

## 2022-11-01 DIAGNOSIS — Z00.8 ENCOUNTER FOR OTHER GENERAL EXAMINATION: ICD-10-CM

## 2022-11-03 VITALS
WEIGHT: 249 LBS | HEIGHT: 72 IN | DIASTOLIC BLOOD PRESSURE: 86 MMHG | BODY MASS INDEX: 33.72 KG/M2 | SYSTOLIC BLOOD PRESSURE: 128 MMHG

## 2022-11-03 LAB
GLUCOSE SERPL-MCNC: 114 MG/DL (ref 60–140)
HDLC SERPL-MCNC: 42 MG/DL
POC CHOLESTEROL, LDL (DOCK): 186 MG/DL
POC CHOLESTEROL, TOTAL: 280 MG/DL
TRIGL SERPL-MCNC: 270 MG/DL

## 2022-11-05 ENCOUNTER — PATIENT MESSAGE (OUTPATIENT)
Dept: PRIMARY CARE CLINIC | Facility: CLINIC | Age: 37
End: 2022-11-05
Payer: COMMERCIAL

## 2023-01-31 ENCOUNTER — OFFICE VISIT (OUTPATIENT)
Dept: PRIMARY CARE CLINIC | Facility: CLINIC | Age: 38
End: 2023-01-31
Payer: COMMERCIAL

## 2023-01-31 VITALS
TEMPERATURE: 98 F | BODY MASS INDEX: 33.74 KG/M2 | SYSTOLIC BLOOD PRESSURE: 112 MMHG | DIASTOLIC BLOOD PRESSURE: 82 MMHG | HEIGHT: 72 IN | WEIGHT: 249.13 LBS | OXYGEN SATURATION: 96 % | HEART RATE: 69 BPM

## 2023-01-31 DIAGNOSIS — F41.9 ANXIETY AND DEPRESSION: ICD-10-CM

## 2023-01-31 DIAGNOSIS — F32.A ANXIETY AND DEPRESSION: ICD-10-CM

## 2023-01-31 DIAGNOSIS — Z00.00 WELL ADULT EXAM: ICD-10-CM

## 2023-01-31 DIAGNOSIS — E66.09 CLASS 1 OBESITY DUE TO EXCESS CALORIES WITH SERIOUS COMORBIDITY AND BODY MASS INDEX (BMI) OF 33.0 TO 33.9 IN ADULT: ICD-10-CM

## 2023-01-31 DIAGNOSIS — Z00.00 GENERAL MEDICAL EXAM: Primary | ICD-10-CM

## 2023-01-31 PROBLEM — E66.9 OBESITY: Status: RESOLVED | Noted: 2019-01-21 | Resolved: 2023-01-31

## 2023-01-31 PROBLEM — F41.1 GAD (GENERALIZED ANXIETY DISORDER): Status: RESOLVED | Noted: 2020-02-13 | Resolved: 2023-01-31

## 2023-01-31 PROBLEM — F32.1 CURRENT MODERATE EPISODE OF MAJOR DEPRESSIVE DISORDER WITHOUT PRIOR EPISODE: Status: RESOLVED | Noted: 2020-02-13 | Resolved: 2023-01-31

## 2023-01-31 PROCEDURE — 1159F MED LIST DOCD IN RCRD: CPT | Mod: CPTII,S$GLB,, | Performed by: FAMILY MEDICINE

## 2023-01-31 PROCEDURE — 3079F DIAST BP 80-89 MM HG: CPT | Mod: CPTII,S$GLB,, | Performed by: FAMILY MEDICINE

## 2023-01-31 PROCEDURE — 3008F BODY MASS INDEX DOCD: CPT | Mod: CPTII,S$GLB,, | Performed by: FAMILY MEDICINE

## 2023-01-31 PROCEDURE — 99395 PREV VISIT EST AGE 18-39: CPT | Mod: S$GLB,,, | Performed by: FAMILY MEDICINE

## 2023-01-31 PROCEDURE — 1159F PR MEDICATION LIST DOCUMENTED IN MEDICAL RECORD: ICD-10-PCS | Mod: CPTII,S$GLB,, | Performed by: FAMILY MEDICINE

## 2023-01-31 PROCEDURE — 1160F PR REVIEW ALL MEDS BY PRESCRIBER/CLIN PHARMACIST DOCUMENTED: ICD-10-PCS | Mod: CPTII,S$GLB,, | Performed by: FAMILY MEDICINE

## 2023-01-31 PROCEDURE — 3074F SYST BP LT 130 MM HG: CPT | Mod: CPTII,S$GLB,, | Performed by: FAMILY MEDICINE

## 2023-01-31 PROCEDURE — 99395 PR PREVENTIVE VISIT,EST,18-39: ICD-10-PCS | Mod: S$GLB,,, | Performed by: FAMILY MEDICINE

## 2023-01-31 PROCEDURE — 3079F PR MOST RECENT DIASTOLIC BLOOD PRESSURE 80-89 MM HG: ICD-10-PCS | Mod: CPTII,S$GLB,, | Performed by: FAMILY MEDICINE

## 2023-01-31 PROCEDURE — 3008F PR BODY MASS INDEX (BMI) DOCUMENTED: ICD-10-PCS | Mod: CPTII,S$GLB,, | Performed by: FAMILY MEDICINE

## 2023-01-31 PROCEDURE — 3074F PR MOST RECENT SYSTOLIC BLOOD PRESSURE < 130 MM HG: ICD-10-PCS | Mod: CPTII,S$GLB,, | Performed by: FAMILY MEDICINE

## 2023-01-31 PROCEDURE — 1160F RVW MEDS BY RX/DR IN RCRD: CPT | Mod: CPTII,S$GLB,, | Performed by: FAMILY MEDICINE

## 2023-01-31 PROCEDURE — 99999 PR PBB SHADOW E&M-EST. PATIENT-LVL III: ICD-10-PCS | Mod: PBBFAC,,, | Performed by: FAMILY MEDICINE

## 2023-01-31 PROCEDURE — 99999 PR PBB SHADOW E&M-EST. PATIENT-LVL III: CPT | Mod: PBBFAC,,, | Performed by: FAMILY MEDICINE

## 2023-01-31 RX ORDER — HYDROXYZINE HYDROCHLORIDE 25 MG/1
TABLET, FILM COATED ORAL
Qty: 60 TABLET | Refills: 1 | Status: SHIPPED | OUTPATIENT
Start: 2023-01-31 | End: 2023-05-08 | Stop reason: SDUPTHER

## 2023-01-31 RX ORDER — FLUOXETINE HYDROCHLORIDE 20 MG/1
CAPSULE ORAL
Qty: 90 CAPSULE | Refills: 3 | Status: SHIPPED | OUTPATIENT
Start: 2023-01-31 | End: 2023-05-08 | Stop reason: SDUPTHER

## 2023-01-31 NOTE — PROGRESS NOTES
/82 (BP Location: Right arm)   Pulse 69   Temp 98 °F (36.7 °C)   Ht 6' (1.829 m)   Wt 113 kg (249 lb 1.9 oz)   SpO2 96%   BMI 33.79 kg/m²       ===========    Chief Complaint: Annual Exam        Spenser Alamo is a 37 y.o. male here for    HPI    Annual Exam.  Health maintenance reviewed with pt in detail inc screening studies such as lab studies and vaccines    Patient queried and denies any further complaints    SURGICAL AND MEDICAL HISTORY: updated and reviewed.  ALLERGIES updated and reviewed.  Review of patient's allergies indicates:   Allergen Reactions    Amoxicillin Rash     CURRENT OUTPATIENT MEDICATIONS updated and reviewed    Current Outpatient Medications:     FLUoxetine 20 MG capsule, One po daily, Disp: 90 capsule, Rfl: 3    hydrOXYzine HCL (ATARAX) 25 MG tablet, 1/2 to 1 po q 8 hr prn acute anxiety, Disp: 60 tablet, Rfl: 1    Lactobacillus rhamnosus GG (CULTURELLE) 10 billion cell capsule, Take 1 capsule by mouth once daily., Disp: , Rfl:     Review of Systems   Constitutional:  Negative for activity change, appetite change, chills, diaphoresis, fatigue, fever and unexpected weight change.   HENT:  Negative for congestion, ear discharge, ear pain, facial swelling, hearing loss, nosebleeds, postnasal drip, rhinorrhea, sinus pressure, sneezing, sore throat, tinnitus, trouble swallowing and voice change.    Eyes:  Negative for photophobia, pain, discharge, redness, itching and visual disturbance.   Respiratory:  Negative for cough, chest tightness, shortness of breath and wheezing.    Cardiovascular:  Negative for chest pain, palpitations and leg swelling.   Gastrointestinal:  Negative for abdominal distention, abdominal pain, anal bleeding, blood in stool, constipation, diarrhea, nausea, rectal pain and vomiting.   Endocrine: Negative for cold intolerance, heat intolerance, polydipsia, polyphagia and polyuria.   Genitourinary:  Negative for difficulty urinating, dysuria and flank  pain.   Musculoskeletal:  Negative for arthralgias, back pain, joint swelling, myalgias and neck pain.   Skin:  Negative for rash.   Neurological:  Negative for dizziness, tremors, seizures, syncope, speech difficulty, weakness, light-headedness, numbness and headaches.   Psychiatric/Behavioral:  Negative for behavioral problems, confusion, decreased concentration, dysphoric mood, sleep disturbance and suicidal ideas. The patient is not nervous/anxious and is not hyperactive.      /82 (BP Location: Right arm)   Pulse 69   Temp 98 °F (36.7 °C)   Ht 6' (1.829 m)   Wt 113 kg (249 lb 1.9 oz)   SpO2 96%   BMI 33.79 kg/m²   Physical Exam  Vitals and nursing note reviewed.   Constitutional:       General: He is not in acute distress.     Appearance: Normal appearance. He is well-developed. He is not ill-appearing or toxic-appearing.   HENT:      Head: Normocephalic and atraumatic.      Right Ear: Tympanic membrane, ear canal and external ear normal.      Left Ear: Tympanic membrane, ear canal and external ear normal.      Nose: Nose normal.      Mouth/Throat:      Lips: Pink.      Mouth: Mucous membranes are moist.      Pharynx: No oropharyngeal exudate or posterior oropharyngeal erythema.   Eyes:      General: No scleral icterus.        Right eye: No discharge.         Left eye: No discharge.      Extraocular Movements: Extraocular movements intact.      Conjunctiva/sclera: Conjunctivae normal.   Cardiovascular:      Rate and Rhythm: Normal rate and regular rhythm.      Pulses: Normal pulses.      Heart sounds: Normal heart sounds. No murmur heard.  Pulmonary:      Effort: Pulmonary effort is normal. No respiratory distress.      Breath sounds: Normal breath sounds. No wheezing or rales.   Abdominal:      General: Bowel sounds are normal. There is no distension.      Palpations: Abdomen is soft. There is no mass.      Tenderness: There is no abdominal tenderness. There is no right CVA tenderness, left CVA  tenderness, guarding or rebound.      Hernia: No hernia is present.   Musculoskeletal:      Cervical back: Normal range of motion and neck supple. No rigidity or tenderness.   Lymphadenopathy:      Cervical: No cervical adenopathy.   Skin:     General: Skin is warm and dry.   Neurological:      General: No focal deficit present.      Mental Status: He is alert. Mental status is at baseline.   Psychiatric:         Mood and Affect: Mood normal.         Behavior: Behavior normal. Behavior is cooperative.       Spenser was seen today for annual exam.    Diagnoses and all orders for this visit:    General medical exam  -     Hepatitis C Antibody; Future  -     CBC Without Differential; Future  -     Comprehensive Metabolic Panel; Future  -     Lipid Panel; Future  -     TSH; Future  -     Hemoglobin A1C; Future  -     HIV 1/2 Ag/Ab (4th Gen); Future  -     T4, Free; Future    Well adult exam    Class 1 obesity due to excess calories with serious comorbidity and body mass index (BMI) of 33.0 to 33.9 in adult    Anxiety and depression    Other orders  -     hydrOXYzine HCL (ATARAX) 25 MG tablet; 1/2 to 1 po q 8 hr prn acute anxiety  -     FLUoxetine 20 MG capsule; One po daily        Reviewed old pertinent medical records available.     All lab results personally reviewed and interpreted by me including last year, if available.        Dieter Bianchi MD

## 2023-02-01 ENCOUNTER — LAB VISIT (OUTPATIENT)
Dept: LAB | Facility: HOSPITAL | Age: 38
End: 2023-02-01
Attending: FAMILY MEDICINE
Payer: COMMERCIAL

## 2023-02-01 DIAGNOSIS — Z00.00 GENERAL MEDICAL EXAM: ICD-10-CM

## 2023-02-01 LAB
ALBUMIN SERPL BCP-MCNC: 4.2 G/DL (ref 3.5–5.2)
ALP SERPL-CCNC: 73 U/L (ref 55–135)
ALT SERPL W/O P-5'-P-CCNC: 15 U/L (ref 10–44)
ANION GAP SERPL CALC-SCNC: 10 MMOL/L (ref 8–16)
AST SERPL-CCNC: 16 U/L (ref 10–40)
BILIRUB SERPL-MCNC: 0.6 MG/DL (ref 0.1–1)
BUN SERPL-MCNC: 13 MG/DL (ref 6–20)
CALCIUM SERPL-MCNC: 9.3 MG/DL (ref 8.7–10.5)
CHLORIDE SERPL-SCNC: 106 MMOL/L (ref 95–110)
CHOLEST SERPL-MCNC: 234 MG/DL (ref 120–199)
CHOLEST/HDLC SERPL: 5.3 {RATIO} (ref 2–5)
CO2 SERPL-SCNC: 21 MMOL/L (ref 23–29)
CREAT SERPL-MCNC: 0.8 MG/DL (ref 0.5–1.4)
ERYTHROCYTE [DISTWIDTH] IN BLOOD BY AUTOMATED COUNT: 12 % (ref 11.5–14.5)
EST. GFR  (NO RACE VARIABLE): >60 ML/MIN/1.73 M^2
ESTIMATED AVG GLUCOSE: 108 MG/DL (ref 68–131)
GLUCOSE SERPL-MCNC: 97 MG/DL (ref 70–110)
HBA1C MFR BLD: 5.4 % (ref 4–5.6)
HCT VFR BLD AUTO: 43.3 % (ref 40–54)
HCV AB SERPL QL IA: NORMAL
HDLC SERPL-MCNC: 44 MG/DL (ref 40–75)
HDLC SERPL: 18.8 % (ref 20–50)
HGB BLD-MCNC: 14.1 G/DL (ref 14–18)
HIV 1+2 AB+HIV1 P24 AG SERPL QL IA: NORMAL
LDLC SERPL CALC-MCNC: 170.2 MG/DL (ref 63–159)
MCH RBC QN AUTO: 30.8 PG (ref 27–31)
MCHC RBC AUTO-ENTMCNC: 32.6 G/DL (ref 32–36)
MCV RBC AUTO: 95 FL (ref 82–98)
NONHDLC SERPL-MCNC: 190 MG/DL
PLATELET # BLD AUTO: 230 K/UL (ref 150–450)
PMV BLD AUTO: 11.1 FL (ref 9.2–12.9)
POTASSIUM SERPL-SCNC: 4.1 MMOL/L (ref 3.5–5.1)
PROT SERPL-MCNC: 7.4 G/DL (ref 6–8.4)
RBC # BLD AUTO: 4.58 M/UL (ref 4.6–6.2)
SODIUM SERPL-SCNC: 137 MMOL/L (ref 136–145)
T4 FREE SERPL-MCNC: 0.96 NG/DL (ref 0.71–1.51)
TRIGL SERPL-MCNC: 99 MG/DL (ref 30–150)
TSH SERPL DL<=0.005 MIU/L-ACNC: 1.22 UIU/ML (ref 0.4–4)
WBC # BLD AUTO: 4.78 K/UL (ref 3.9–12.7)

## 2023-02-01 PROCEDURE — 36415 COLL VENOUS BLD VENIPUNCTURE: CPT | Mod: PN | Performed by: FAMILY MEDICINE

## 2023-02-01 PROCEDURE — 83036 HEMOGLOBIN GLYCOSYLATED A1C: CPT | Performed by: FAMILY MEDICINE

## 2023-02-01 PROCEDURE — 85027 COMPLETE CBC AUTOMATED: CPT | Performed by: FAMILY MEDICINE

## 2023-02-01 PROCEDURE — 84443 ASSAY THYROID STIM HORMONE: CPT | Performed by: FAMILY MEDICINE

## 2023-02-01 PROCEDURE — 80053 COMPREHEN METABOLIC PANEL: CPT | Performed by: FAMILY MEDICINE

## 2023-02-01 PROCEDURE — 80061 LIPID PANEL: CPT | Performed by: FAMILY MEDICINE

## 2023-02-01 PROCEDURE — 84439 ASSAY OF FREE THYROXINE: CPT | Performed by: FAMILY MEDICINE

## 2023-02-01 PROCEDURE — 86803 HEPATITIS C AB TEST: CPT | Performed by: FAMILY MEDICINE

## 2023-02-01 PROCEDURE — 87389 HIV-1 AG W/HIV-1&-2 AB AG IA: CPT | Performed by: FAMILY MEDICINE

## 2023-02-06 ENCOUNTER — PATIENT MESSAGE (OUTPATIENT)
Dept: PRIMARY CARE CLINIC | Facility: CLINIC | Age: 38
End: 2023-02-06
Payer: COMMERCIAL

## 2023-02-06 RX ORDER — ROSUVASTATIN CALCIUM 10 MG/1
10 TABLET, COATED ORAL NIGHTLY
Qty: 90 TABLET | Refills: 0 | Status: SHIPPED | OUTPATIENT
Start: 2023-02-06 | End: 2023-02-06 | Stop reason: SDUPTHER

## 2023-02-06 RX ORDER — ROSUVASTATIN CALCIUM 10 MG/1
10 TABLET, COATED ORAL NIGHTLY
Qty: 90 TABLET | Refills: 1 | Status: SHIPPED | OUTPATIENT
Start: 2023-02-06 | End: 2023-05-08 | Stop reason: SDUPTHER

## 2023-02-27 ENCOUNTER — PATIENT MESSAGE (OUTPATIENT)
Dept: PRIMARY CARE CLINIC | Facility: CLINIC | Age: 38
End: 2023-02-27
Payer: COMMERCIAL

## 2023-02-28 RX ORDER — PROMETHAZINE HYDROCHLORIDE AND DEXTROMETHORPHAN HYDROBROMIDE 6.25; 15 MG/5ML; MG/5ML
5 SYRUP ORAL NIGHTLY PRN
Qty: 118 ML | Refills: 0 | Status: SHIPPED | OUTPATIENT
Start: 2023-02-28 | End: 2023-05-08

## 2023-02-28 RX ORDER — ALBUTEROL SULFATE 90 UG/1
2 AEROSOL, METERED RESPIRATORY (INHALATION) EVERY 6 HOURS PRN
Qty: 18 G | Refills: 1 | Status: SHIPPED | OUTPATIENT
Start: 2023-02-28 | End: 2023-05-08

## 2023-05-08 ENCOUNTER — OFFICE VISIT (OUTPATIENT)
Dept: PRIMARY CARE CLINIC | Facility: CLINIC | Age: 38
End: 2023-05-08
Payer: COMMERCIAL

## 2023-05-08 ENCOUNTER — LAB VISIT (OUTPATIENT)
Dept: LAB | Facility: HOSPITAL | Age: 38
End: 2023-05-08
Attending: FAMILY MEDICINE
Payer: COMMERCIAL

## 2023-05-08 VITALS
HEART RATE: 100 BPM | BODY MASS INDEX: 34.34 KG/M2 | DIASTOLIC BLOOD PRESSURE: 82 MMHG | SYSTOLIC BLOOD PRESSURE: 118 MMHG | TEMPERATURE: 98 F | WEIGHT: 253.5 LBS | OXYGEN SATURATION: 98 % | HEIGHT: 72 IN

## 2023-05-08 DIAGNOSIS — M79.10 MYALGIA: ICD-10-CM

## 2023-05-08 DIAGNOSIS — Z00.00 WELL ADULT EXAM: Primary | ICD-10-CM

## 2023-05-08 DIAGNOSIS — Z79.899 ON STATIN THERAPY: ICD-10-CM

## 2023-05-08 DIAGNOSIS — E78.2 HYPERLIPIDEMIA, MIXED: ICD-10-CM

## 2023-05-08 DIAGNOSIS — F41.1 GAD (GENERALIZED ANXIETY DISORDER): ICD-10-CM

## 2023-05-08 DIAGNOSIS — E66.09 CLASS 1 OBESITY DUE TO EXCESS CALORIES WITH SERIOUS COMORBIDITY AND BODY MASS INDEX (BMI) OF 34.0 TO 34.9 IN ADULT: ICD-10-CM

## 2023-05-08 PROBLEM — F41.9 ANXIETY AND DEPRESSION: Status: RESOLVED | Noted: 2023-01-31 | Resolved: 2023-05-08

## 2023-05-08 PROBLEM — F32.A ANXIETY AND DEPRESSION: Status: RESOLVED | Noted: 2023-01-31 | Resolved: 2023-05-08

## 2023-05-08 LAB
ALBUMIN SERPL BCP-MCNC: 4.3 G/DL (ref 3.5–5.2)
ALP SERPL-CCNC: 78 U/L (ref 55–135)
ALT SERPL W/O P-5'-P-CCNC: 19 U/L (ref 10–44)
ANION GAP SERPL CALC-SCNC: 8 MMOL/L (ref 8–16)
AST SERPL-CCNC: 16 U/L (ref 10–40)
BILIRUB SERPL-MCNC: 0.4 MG/DL (ref 0.1–1)
BUN SERPL-MCNC: 11 MG/DL (ref 6–20)
CALCIUM SERPL-MCNC: 9.5 MG/DL (ref 8.7–10.5)
CHLORIDE SERPL-SCNC: 106 MMOL/L (ref 95–110)
CK SERPL-CCNC: 127 U/L (ref 20–200)
CO2 SERPL-SCNC: 26 MMOL/L (ref 23–29)
CREAT SERPL-MCNC: 0.8 MG/DL (ref 0.5–1.4)
EST. GFR  (NO RACE VARIABLE): >60 ML/MIN/1.73 M^2
GLUCOSE SERPL-MCNC: 94 MG/DL (ref 70–110)
POTASSIUM SERPL-SCNC: 4 MMOL/L (ref 3.5–5.1)
PROT SERPL-MCNC: 7.6 G/DL (ref 6–8.4)
SODIUM SERPL-SCNC: 140 MMOL/L (ref 136–145)

## 2023-05-08 PROCEDURE — 99999 PR PBB SHADOW E&M-EST. PATIENT-LVL III: CPT | Mod: PBBFAC,,, | Performed by: FAMILY MEDICINE

## 2023-05-08 PROCEDURE — 3074F SYST BP LT 130 MM HG: CPT | Mod: CPTII,S$GLB,, | Performed by: FAMILY MEDICINE

## 2023-05-08 PROCEDURE — 1159F MED LIST DOCD IN RCRD: CPT | Mod: CPTII,S$GLB,, | Performed by: FAMILY MEDICINE

## 2023-05-08 PROCEDURE — 36415 COLL VENOUS BLD VENIPUNCTURE: CPT | Mod: PN | Performed by: FAMILY MEDICINE

## 2023-05-08 PROCEDURE — 3044F HG A1C LEVEL LT 7.0%: CPT | Mod: CPTII,S$GLB,, | Performed by: FAMILY MEDICINE

## 2023-05-08 PROCEDURE — 1159F PR MEDICATION LIST DOCUMENTED IN MEDICAL RECORD: ICD-10-PCS | Mod: CPTII,S$GLB,, | Performed by: FAMILY MEDICINE

## 2023-05-08 PROCEDURE — 3008F PR BODY MASS INDEX (BMI) DOCUMENTED: ICD-10-PCS | Mod: CPTII,S$GLB,, | Performed by: FAMILY MEDICINE

## 2023-05-08 PROCEDURE — 3074F PR MOST RECENT SYSTOLIC BLOOD PRESSURE < 130 MM HG: ICD-10-PCS | Mod: CPTII,S$GLB,, | Performed by: FAMILY MEDICINE

## 2023-05-08 PROCEDURE — 3008F BODY MASS INDEX DOCD: CPT | Mod: CPTII,S$GLB,, | Performed by: FAMILY MEDICINE

## 2023-05-08 PROCEDURE — 99214 OFFICE O/P EST MOD 30 MIN: CPT | Mod: S$GLB,,, | Performed by: FAMILY MEDICINE

## 2023-05-08 PROCEDURE — 80053 COMPREHEN METABOLIC PANEL: CPT | Performed by: FAMILY MEDICINE

## 2023-05-08 PROCEDURE — 99999 PR PBB SHADOW E&M-EST. PATIENT-LVL III: ICD-10-PCS | Mod: PBBFAC,,, | Performed by: FAMILY MEDICINE

## 2023-05-08 PROCEDURE — 3079F PR MOST RECENT DIASTOLIC BLOOD PRESSURE 80-89 MM HG: ICD-10-PCS | Mod: CPTII,S$GLB,, | Performed by: FAMILY MEDICINE

## 2023-05-08 PROCEDURE — 3044F PR MOST RECENT HEMOGLOBIN A1C LEVEL <7.0%: ICD-10-PCS | Mod: CPTII,S$GLB,, | Performed by: FAMILY MEDICINE

## 2023-05-08 PROCEDURE — 99214 PR OFFICE/OUTPT VISIT, EST, LEVL IV, 30-39 MIN: ICD-10-PCS | Mod: S$GLB,,, | Performed by: FAMILY MEDICINE

## 2023-05-08 PROCEDURE — 3079F DIAST BP 80-89 MM HG: CPT | Mod: CPTII,S$GLB,, | Performed by: FAMILY MEDICINE

## 2023-05-08 PROCEDURE — 1160F PR REVIEW ALL MEDS BY PRESCRIBER/CLIN PHARMACIST DOCUMENTED: ICD-10-PCS | Mod: CPTII,S$GLB,, | Performed by: FAMILY MEDICINE

## 2023-05-08 PROCEDURE — 82550 ASSAY OF CK (CPK): CPT | Performed by: FAMILY MEDICINE

## 2023-05-08 PROCEDURE — 1160F RVW MEDS BY RX/DR IN RCRD: CPT | Mod: CPTII,S$GLB,, | Performed by: FAMILY MEDICINE

## 2023-05-08 RX ORDER — ROSUVASTATIN CALCIUM 10 MG/1
10 TABLET, COATED ORAL NIGHTLY
Qty: 90 TABLET | Refills: 3 | Status: SHIPPED | OUTPATIENT
Start: 2023-05-08

## 2023-05-08 RX ORDER — HYDROXYZINE HYDROCHLORIDE 25 MG/1
TABLET, FILM COATED ORAL
Qty: 90 TABLET | Refills: 1 | Status: SHIPPED | OUTPATIENT
Start: 2023-05-08

## 2023-05-08 RX ORDER — FLUOXETINE HYDROCHLORIDE 20 MG/1
CAPSULE ORAL
Qty: 90 CAPSULE | Refills: 3 | Status: SHIPPED | OUTPATIENT
Start: 2023-05-08

## 2023-05-08 NOTE — PROGRESS NOTES
/82 (BP Location: Left arm, Patient Position: Sitting, BP Method: Large (Manual))   Pulse 100   Temp 98 °F (36.7 °C) (Oral)   Ht 6' (1.829 m)   Wt 115 kg (253 lb 8.5 oz)   SpO2 98%   BMI 34.38 kg/m²       ===========    Chief Complaint: No chief complaint on file.          HPI    Spenser Alamo is a 37 y.o. male     here for    F/u of mixed hyperlipidemia and generalized anxiety disorder.    Overall reports doing well but is moving  I have states soon and wants to assure he is utd with labs and refills.    On statin for mixed hyperlipidemia and doing well on Prozac.    Does has some occasional myalgias.      Patient queried and denies any further complaints    Patient has MEDICAL HISTORY OF  Patient Active Problem List   Diagnosis    Plantar fasciitis    Class 1 obesity due to excess calories with serious comorbidity in adult    KALEB (generalized anxiety disorder)    Decreased strength of upper extremity    Chronic right shoulder pain    Dermatophytosis of the trunk    On statin therapy    Myalgia    Hyperlipidemia, mixed       SURGICAL AND MEDICAL HISTORY: updated and reviewed.  Past Surgical History:   Procedure Laterality Date    TONSILLECTOMY      WISDOM TOOTH EXTRACTION Bilateral      ALLERGIES updated and reviewed.  Review of patient's allergies indicates:   Allergen Reactions    Amoxicillin Rash       CURRENT OUTPATIENT MEDICATIONS updated and reviewed    Current Outpatient Medications:     Lactobacillus rhamnosus GG (CULTURELLE) 10 billion cell capsule, Take 1 capsule by mouth once daily., Disp: , Rfl:     FLUoxetine 20 MG capsule, One po daily, Disp: 90 capsule, Rfl: 3    hydrOXYzine HCL (ATARAX) 25 MG tablet, 1/2 to 1 po q 8 hr prn acute anxiety, Disp: 90 tablet, Rfl: 1    rosuvastatin (CRESTOR) 10 MG tablet, Take 1 tablet (10 mg total) by mouth every evening., Disp: 90 tablet, Rfl: 3    Review of Systems   Constitutional:  Negative for activity change, appetite change, chills,  diaphoresis, fatigue, fever and unexpected weight change.   HENT:  Negative for congestion, ear discharge, ear pain, facial swelling, hearing loss, nosebleeds, postnasal drip, rhinorrhea, sinus pressure, sneezing, sore throat, tinnitus, trouble swallowing and voice change.    Eyes:  Negative for photophobia, pain, discharge, redness, itching and visual disturbance.   Respiratory:  Negative for cough, chest tightness, shortness of breath and wheezing.    Cardiovascular:  Negative for chest pain, palpitations and leg swelling.   Gastrointestinal:  Negative for abdominal distention, abdominal pain, anal bleeding, blood in stool, constipation, diarrhea, nausea, rectal pain and vomiting.   Endocrine: Negative for cold intolerance, heat intolerance, polydipsia, polyphagia and polyuria.   Genitourinary:  Negative for difficulty urinating, dysuria and flank pain.   Musculoskeletal:  Negative for arthralgias, back pain, joint swelling, myalgias and neck pain.   Skin:  Negative for rash.   Neurological:  Negative for dizziness, tremors, seizures, syncope, speech difficulty, weakness, light-headedness, numbness and headaches.   Psychiatric/Behavioral:  Negative for behavioral problems, confusion, decreased concentration, dysphoric mood, sleep disturbance and suicidal ideas. The patient is not nervous/anxious and is not hyperactive.      /82 (BP Location: Left arm, Patient Position: Sitting, BP Method: Large (Manual))   Pulse 100   Temp 98 °F (36.7 °C) (Oral)   Ht 6' (1.829 m)   Wt 115 kg (253 lb 8.5 oz)   SpO2 98%   BMI 34.38 kg/m²   Physical Exam  Vitals and nursing note reviewed.   Constitutional:       General: He is not in acute distress.     Appearance: Normal appearance. He is well-developed. He is not ill-appearing or toxic-appearing.   HENT:      Head: Normocephalic and atraumatic.      Right Ear: Tympanic membrane, ear canal and external ear normal.      Left Ear: Tympanic membrane, ear canal and external  ear normal.      Nose: Nose normal.      Mouth/Throat:      Lips: Pink.      Mouth: Mucous membranes are moist.      Pharynx: No oropharyngeal exudate or posterior oropharyngeal erythema.   Eyes:      General: No scleral icterus.        Right eye: No discharge.         Left eye: No discharge.      Extraocular Movements: Extraocular movements intact.      Conjunctiva/sclera: Conjunctivae normal.   Cardiovascular:      Rate and Rhythm: Normal rate and regular rhythm.      Pulses: Normal pulses.      Heart sounds: Normal heart sounds. No murmur heard.  Pulmonary:      Effort: Pulmonary effort is normal. No respiratory distress.      Breath sounds: Normal breath sounds. No wheezing or rales.   Abdominal:      General: Bowel sounds are normal. There is no distension.      Palpations: Abdomen is soft. There is no mass.      Tenderness: There is no abdominal tenderness. There is no right CVA tenderness, left CVA tenderness, guarding or rebound.      Hernia: No hernia is present.   Musculoskeletal:      Cervical back: Normal range of motion and neck supple. No rigidity or tenderness.   Lymphadenopathy:      Cervical: No cervical adenopathy.   Skin:     General: Skin is warm and dry.   Neurological:      General: No focal deficit present.      Mental Status: He is alert. Mental status is at baseline.   Psychiatric:         Mood and Affect: Mood normal.         Behavior: Behavior normal. Behavior is cooperative.       ASSESSMENT/PLAN  Diagnoses and all orders for this visit:    Well adult exam    Hyperlipidemia, mixed  -     Comprehensive Metabolic Panel; Future  -     Lipid Panel; Future    KALEB (generalized anxiety disorder)    Class 1 obesity due to excess calories with serious comorbidity and body mass index (BMI) of 34.0 to 34.9 in adult    On statin therapy  -     CK; Future    Myalgia  -     CK; Future  -     Comprehensive Metabolic Panel; Future  -     Lipid Panel; Future    Other orders  -     rosuvastatin (CRESTOR)  10 MG tablet; Take 1 tablet (10 mg total) by mouth every evening.  -     FLUoxetine 20 MG capsule; One po daily  -     hydrOXYzine HCL (ATARAX) 25 MG tablet; 1/2 to 1 po q 8 hr prn acute anxiety      Check CK because of statin therapy to assure this is not elevated and a cause of the myalgias.  Continue weight loss by calorie restriction and exercise.  Continue low-fat diet--more of a low-sodium Mediterranean diet.  Continue SSRI     Recommend follow-up with PCP in 6 months.      All lab results over past 2 years available reviewed inc labs and any cardiology or radiology studies.  Any new prescription medications gone over in detail including reason for taking the medication, the general mechanism of action, most common possible side effects and possible costs, etcetera.    Chronic conditions updated. Other than changes or additions as above, cont current medications and maintain follow-up with specialists if indicated.     Dieter Bianchi MD  A dictation device was used to produce this document. Use of such devices sometimes results in grammatical errors or replacement of words that sound similarly.

## 2023-05-16 PROBLEM — Z79.899 ON STATIN THERAPY: Status: ACTIVE | Noted: 2023-05-16

## 2023-05-16 PROBLEM — E78.2 HYPERLIPIDEMIA, MIXED: Status: ACTIVE | Noted: 2023-05-16

## 2023-05-16 PROBLEM — M79.10 MYALGIA: Status: ACTIVE | Noted: 2023-05-16
